# Patient Record
Sex: MALE | Race: WHITE | NOT HISPANIC OR LATINO | URBAN - METROPOLITAN AREA
[De-identification: names, ages, dates, MRNs, and addresses within clinical notes are randomized per-mention and may not be internally consistent; named-entity substitution may affect disease eponyms.]

---

## 2024-09-29 ENCOUNTER — INPATIENT (INPATIENT)
Facility: HOSPITAL | Age: 24
LOS: 4 days | Discharge: HOME | End: 2024-10-04
Attending: THORACIC SURGERY (CARDIOTHORACIC VASCULAR SURGERY) | Admitting: INTERNAL MEDICINE
Payer: COMMERCIAL

## 2024-09-29 VITALS
SYSTOLIC BLOOD PRESSURE: 139 MMHG | HEART RATE: 96 BPM | HEIGHT: 74 IN | WEIGHT: 199.96 LBS | TEMPERATURE: 99 F | OXYGEN SATURATION: 99 % | RESPIRATION RATE: 24 BRPM | DIASTOLIC BLOOD PRESSURE: 75 MMHG

## 2024-09-29 LAB
ANION GAP SERPL CALC-SCNC: 11 MMOL/L — SIGNIFICANT CHANGE UP (ref 5–17)
BASE EXCESS BLDV CALC-SCNC: 1.4 MMOL/L — SIGNIFICANT CHANGE UP (ref -2–3)
BASOPHILS # BLD AUTO: 0.02 K/UL — SIGNIFICANT CHANGE UP (ref 0–0.2)
BASOPHILS NFR BLD AUTO: 0.2 % — SIGNIFICANT CHANGE UP (ref 0–2)
BUN SERPL-MCNC: 15 MG/DL — SIGNIFICANT CHANGE UP (ref 7–23)
CA-I SERPL-SCNC: 1.19 MMOL/L — SIGNIFICANT CHANGE UP (ref 1.15–1.33)
CALCIUM SERPL-MCNC: 9.2 MG/DL — SIGNIFICANT CHANGE UP (ref 8.4–10.5)
CHLORIDE SERPL-SCNC: 106 MMOL/L — SIGNIFICANT CHANGE UP (ref 96–108)
CO2 BLDV-SCNC: 30 MMOL/L — HIGH (ref 22–26)
CO2 SERPL-SCNC: 24 MMOL/L — SIGNIFICANT CHANGE UP (ref 22–31)
CREAT SERPL-MCNC: 0.86 MG/DL — SIGNIFICANT CHANGE UP (ref 0.5–1.3)
EGFR: 125 ML/MIN/1.73M2 — SIGNIFICANT CHANGE UP
EOSINOPHIL # BLD AUTO: 0.22 K/UL — SIGNIFICANT CHANGE UP (ref 0–0.5)
EOSINOPHIL NFR BLD AUTO: 2.3 % — SIGNIFICANT CHANGE UP (ref 0–6)
FLUAV AG NPH QL: SIGNIFICANT CHANGE UP
FLUBV AG NPH QL: SIGNIFICANT CHANGE UP
GAS PNL BLDV: 136 MMOL/L — SIGNIFICANT CHANGE UP (ref 136–145)
GAS PNL BLDV: SIGNIFICANT CHANGE UP
GLUCOSE SERPL-MCNC: 101 MG/DL — HIGH (ref 70–99)
HCO3 BLDV-SCNC: 28 MMOL/L — SIGNIFICANT CHANGE UP (ref 22–29)
HCT VFR BLD CALC: 43.1 % — SIGNIFICANT CHANGE UP (ref 39–50)
HGB BLD-MCNC: 14.5 G/DL — SIGNIFICANT CHANGE UP (ref 13–17)
IMM GRANULOCYTES NFR BLD AUTO: 0.4 % — SIGNIFICANT CHANGE UP (ref 0–0.9)
LYMPHOCYTES # BLD AUTO: 1.36 K/UL — SIGNIFICANT CHANGE UP (ref 1–3.3)
LYMPHOCYTES # BLD AUTO: 14.3 % — SIGNIFICANT CHANGE UP (ref 13–44)
MCHC RBC-ENTMCNC: 29 PG — SIGNIFICANT CHANGE UP (ref 27–34)
MCHC RBC-ENTMCNC: 33.6 GM/DL — SIGNIFICANT CHANGE UP (ref 32–36)
MCV RBC AUTO: 86.2 FL — SIGNIFICANT CHANGE UP (ref 80–100)
MONOCYTES # BLD AUTO: 0.74 K/UL — SIGNIFICANT CHANGE UP (ref 0–0.9)
MONOCYTES NFR BLD AUTO: 7.8 % — SIGNIFICANT CHANGE UP (ref 2–14)
NEUTROPHILS # BLD AUTO: 7.11 K/UL — SIGNIFICANT CHANGE UP (ref 1.8–7.4)
NEUTROPHILS NFR BLD AUTO: 75 % — SIGNIFICANT CHANGE UP (ref 43–77)
NRBC # BLD: 0 /100 WBCS — SIGNIFICANT CHANGE UP (ref 0–0)
PCO2 BLDV: 52 MMHG — SIGNIFICANT CHANGE UP (ref 42–55)
PH BLDV: 7.34 — SIGNIFICANT CHANGE UP (ref 7.32–7.43)
PLATELET # BLD AUTO: 225 K/UL — SIGNIFICANT CHANGE UP (ref 150–400)
PO2 BLDV: <33 MMHG — SIGNIFICANT CHANGE UP (ref 25–45)
POTASSIUM BLDV-SCNC: 4.6 MMOL/L — SIGNIFICANT CHANGE UP (ref 3.5–5.1)
POTASSIUM SERPL-MCNC: 4.5 MMOL/L — SIGNIFICANT CHANGE UP (ref 3.5–5.3)
POTASSIUM SERPL-SCNC: 4.5 MMOL/L — SIGNIFICANT CHANGE UP (ref 3.5–5.3)
RAPID RVP RESULT: SIGNIFICANT CHANGE UP
RBC # BLD: 5 M/UL — SIGNIFICANT CHANGE UP (ref 4.2–5.8)
RBC # FLD: 12.2 % — SIGNIFICANT CHANGE UP (ref 10.3–14.5)
RSV RNA NPH QL NAA+NON-PROBE: SIGNIFICANT CHANGE UP
SAO2 % BLDV: 37.9 % — LOW (ref 67–88)
SARS-COV-2 RNA SPEC QL NAA+PROBE: SIGNIFICANT CHANGE UP
SARS-COV-2 RNA SPEC QL NAA+PROBE: SIGNIFICANT CHANGE UP
SODIUM SERPL-SCNC: 141 MMOL/L — SIGNIFICANT CHANGE UP (ref 135–145)
WBC # BLD: 9.49 K/UL — SIGNIFICANT CHANGE UP (ref 3.8–10.5)
WBC # FLD AUTO: 9.49 K/UL — SIGNIFICANT CHANGE UP (ref 3.8–10.5)

## 2024-09-29 PROCEDURE — 99291 CRITICAL CARE FIRST HOUR: CPT

## 2024-09-29 PROCEDURE — 71045 X-RAY EXAM CHEST 1 VIEW: CPT | Mod: 26

## 2024-09-29 PROCEDURE — 71250 CT THORAX DX C-: CPT | Mod: 26

## 2024-09-29 PROCEDURE — 99223 1ST HOSP IP/OBS HIGH 75: CPT | Mod: GC,25

## 2024-09-29 PROCEDURE — 32551 INSERTION OF CHEST TUBE: CPT | Mod: GC,RT

## 2024-09-29 PROCEDURE — 93010 ELECTROCARDIOGRAM REPORT: CPT

## 2024-09-29 RX ORDER — KETOROLAC TROMETHAMINE 10 MG/1
15 TABLET, FILM COATED ORAL EVERY 6 HOURS
Refills: 0 | Status: DISCONTINUED | OUTPATIENT
Start: 2024-09-29 | End: 2024-10-01

## 2024-09-29 RX ORDER — KETOROLAC TROMETHAMINE 10 MG/1
30 TABLET, FILM COATED ORAL EVERY 6 HOURS
Refills: 0 | Status: DISCONTINUED | OUTPATIENT
Start: 2024-09-29 | End: 2024-09-30

## 2024-09-29 RX ORDER — IPRATROPIUM BROMIDE AND ALBUTEROL SULFATE .5; 3 MG/3ML; MG/3ML
3 SOLUTION RESPIRATORY (INHALATION) ONCE
Refills: 0 | Status: COMPLETED | OUTPATIENT
Start: 2024-09-29 | End: 2024-09-29

## 2024-09-29 RX ORDER — METHYLPREDNISOLONE ACETATE 80 MG/ML
125 INJECTION, SUSPENSION INTRA-ARTICULAR; INTRALESIONAL; INTRAMUSCULAR; SOFT TISSUE ONCE
Refills: 0 | Status: COMPLETED | OUTPATIENT
Start: 2024-09-29 | End: 2024-09-29

## 2024-09-29 RX ORDER — ACETAMINOPHEN 325 MG
650 TABLET ORAL EVERY 6 HOURS
Refills: 0 | Status: DISCONTINUED | OUTPATIENT
Start: 2024-09-29 | End: 2024-09-30

## 2024-09-29 RX ORDER — KETOROLAC TROMETHAMINE 10 MG/1
15 TABLET, FILM COATED ORAL ONCE
Refills: 0 | Status: DISCONTINUED | OUTPATIENT
Start: 2024-09-29 | End: 2024-09-29

## 2024-09-29 RX ORDER — ACETAMINOPHEN 325 MG
650 TABLET ORAL ONCE
Refills: 0 | Status: COMPLETED | OUTPATIENT
Start: 2024-09-29 | End: 2024-09-29

## 2024-09-29 RX ORDER — SODIUM CHLORIDE 0.9 % (FLUSH) 0.9 %
1000 SYRINGE (ML) INJECTION ONCE
Refills: 0 | Status: COMPLETED | OUTPATIENT
Start: 2024-09-29 | End: 2024-09-29

## 2024-09-29 RX ADMIN — METHYLPREDNISOLONE ACETATE 125 MILLIGRAM(S): 80 INJECTION, SUSPENSION INTRA-ARTICULAR; INTRALESIONAL; INTRAMUSCULAR; SOFT TISSUE at 15:36

## 2024-09-29 RX ADMIN — KETOROLAC TROMETHAMINE 15 MILLIGRAM(S): 10 TABLET, FILM COATED ORAL at 20:00

## 2024-09-29 RX ADMIN — KETOROLAC TROMETHAMINE 15 MILLIGRAM(S): 10 TABLET, FILM COATED ORAL at 19:33

## 2024-09-29 RX ADMIN — IPRATROPIUM BROMIDE AND ALBUTEROL SULFATE 3 MILLILITER(S): .5; 3 SOLUTION RESPIRATORY (INHALATION) at 15:53

## 2024-09-29 RX ADMIN — IPRATROPIUM BROMIDE AND ALBUTEROL SULFATE 3 MILLILITER(S): .5; 3 SOLUTION RESPIRATORY (INHALATION) at 15:36

## 2024-09-29 RX ADMIN — Medication 1000 MILLILITER(S): at 15:39

## 2024-09-29 RX ADMIN — Medication 650 MILLIGRAM(S): at 15:36

## 2024-09-29 RX ADMIN — KETOROLAC TROMETHAMINE 15 MILLIGRAM(S): 10 TABLET, FILM COATED ORAL at 15:36

## 2024-09-29 NOTE — ED ADULT NURSE NOTE - OBJECTIVE STATEMENT
24 year old M patient with c/o chest pain x3days and SOB with cough x2days.  Speaking full clear sentences, A+Ox3, ambulatory w steady gait.  Placed on CM.  Went to  and told to come to ED.  IV #20 G to RAC placed, blood work sent.  Pt medicated as ordered.  Pt states he was sick 2 weeks ago, fever, which has subsided.

## 2024-09-29 NOTE — ED PROVIDER NOTE - PROGRESS NOTE DETAILS
[place]: CXR w/ large PTX. Remains stable. Will page pulm (pt stable) and start NRB. Updated pt. [place]: Labs grossly wnl. Pulm placed chest tube, pt feeling much better. Rediscussed w/ pulm, will prder CT and admit for further care. Updated pt.

## 2024-09-29 NOTE — CONSULT NOTE ADULT - ATTENDING COMMENTS
Primary spontaneous pneumothorax, s/p chest tube placement. Lung re expanded immediately. No parenchymal pulmonary disease on CT chest. Clamp trial in AM and removal. Pulmonary will follow. Primary spontaneous pneumothorax, s/p chest tube placement. Lung re expanded immediately. CT chest pending. Clamp trial in AM and removal. Pulmonary will follow.

## 2024-09-29 NOTE — PATIENT PROFILE ADULT - FUNCTIONAL ASSESSMENT - DAILY ACTIVITY 2.
Addended by: LOPEZ COHEN on: 4/22/2024 03:47 PM     Modules accepted: Level of Service     4 = No assist / stand by assistance

## 2024-09-29 NOTE — H&P ADULT - HISTORY OF PRESENT ILLNESS
This is a 24 y.o. male PMH of childhood asthma who presents to [facility] ED complaining of a sudden sharp pain in right lateral chest as well as difficulty breathing following a severe bout of coughing earlier today. Patient was in usual state of health until 12 days ago, when he started to develop a productive cough w/ yellow sputum. Patient also noted mild fevers to 100.3 at home, without any other associated symptoms. Patient continued to cough for 12 days, did not test for flu or covid. Earlier today, patient was coughing and experienced severe R lower chest pain which has since migrated to sternum.     ED Course-  VS:  98.7 F oral  |  139/75 mm Hg  |  69 HR /min  |  RR 24/min  |   90% room air  Labs:  CBC wnl   |  CMP wnl  | ABG pH 7.34 PCO2 30 | RVP negative  Imaging: CXR showing large R PTX   EKG: NSR, Qtc 418  Consults: Pulm  Interventions: R chest tube placement This is a 24 y.o. male PMH of childhood asthma who presents to [facility] ED complaining of a sudden sharp pain in right lateral chest as well as difficulty breathing following a severe bout of coughing earlier today. Patient was in usual state of health until 12 days ago, when he started to develop a productive cough w/ yellow sputum. Patient also noted mild fevers to 100.3 at home, without any other associated symptoms. Patient continued to cough for 12 days, did not test for flu or covid. Earlier today, patient was coughing and experienced severe R lower chest pain which has since migrated to sternum.     ED Course-  VS:  98.7 F oral  |  139/75 mm Hg  |  69 HR /min  |  RR 24/min  |   90% room air  Labs:  CBC wnl   |  CMP wnl  | ABG pH 7.34 PCO2 30 | COVID -, Influenza -, RSV -   Imaging: CXR showing large R PTX   EKG: NSR, Qtc 418  Consults: Pulm  Interventions: R chest tube placement
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
## 2024-09-29 NOTE — H&P ADULT - NSHPPHYSICALEXAM_GEN_ALL_CORE
T(C): 37.1 (09-29-24 @ 15:18), Max: 37.1 (09-29-24 @ 15:18)  HR: 85 (09-29-24 @ 18:04) (85 - 100)  BP: 127/72 (09-29-24 @ 18:04) (127/72 - 200/180)  RR: 16 (09-29-24 @ 18:04) (16 - 26)  SpO2: 100% (09-29-24 @ 18:04) (99% - 100%)    General: NAD, awake and alert, cooperative to exam, cooperating to exam  HEENT: No conjunctival injection, EOMI, MMM  Neck: supple, no JVD  Cardio: Euvolemic on exam, Warm and well perfused in bilateral upper and lower extremities, heart is with RRR, no murmurs auscultated  Resp: in respiratory distress on NRB mask, no cough, no crackles or wheezes auscultated.  Abdo: soft, NT/ND, +bowel sounds x4, no organomegaly or palpable mass    Vasc: 2+ radial and DP pulses b/l  Neuro: A&Ox3, no focal deficits  MSK: no joint swelling, full ROM

## 2024-09-29 NOTE — ED PROVIDER NOTE - OBJECTIVE STATEMENT
24M PMH childhood asthma p/w CP/cough/SOB. Has 12d of cough, yellow sputum. today while coughing he felt sudden onset of pain to R lateral chest and mid sternal region. Pain is worse w/ cough/movement/deep breaths. Had fever 2w ago, none since then. No other systemic symptoms. 24M PMH childhood asthma p/w CP/cough/SOB. Has 12d of cough, yellow sputum. today while coughing he felt sudden onset of pain to R lateral chest and mid sternal region. Pain is worse w/ cough/movement/deep breaths. +SOB. Felt lightheaded, now improved. Had fever 2w ago, none since then. No other systemic symptoms.  Denies rhinorrhea, sore throat, HA, NVD, abd pain, urinary complaints, LE pain/swelling, recent travel/immobilization.

## 2024-09-29 NOTE — ED ADULT TRIAGE NOTE - CHIEF COMPLAINT QUOTE
"I have been sick for 12 days but I can't breath now and I have a lot of chest pain". Patient O2 sat was 90% on room air and patient was breathing at 30 when he walked in

## 2024-09-29 NOTE — H&P ADULT - ASSESSMENT
This is a 24 y.o. male PMH of childhood asthma who complains of 1 day of sudden sharp pain in right lateral chest as well as difficulty breathing in the setting of 12 days of productive cough and mild fevers found to have large R PTX on chest X-ray admitted to [facility] for monitoring s/p chest tube placement in the ED.     NEURO   BERNARDINO     CVS   BERNARDINO     PULM   #R Pneumothorax   CXR significant for large R PTX   - s/p chest tube placement in ED   - f/u repeat CXR   - keep chest tube to suction o/n at -20     GI/   BERNARDINO    RENAL   BERNARDINO    ENDO   BERNARDINO    MSK   BERNARDINO    PPX   F: none  E: replete as needed  N: regular diet   VTE ppx: Lovenox 40 SubQ  GI ppx: none   Dispo: [facility]  Code status: FULL CODE   This is a 24 y.o. male PMH of childhood asthma who complains of 1 day of sudden sharp pain in right lateral chest as well as difficulty breathing in the setting of 12 days of productive cough and mild fevers found to have large R PTX on chest X-ray admitted to [facility] for monitoring s/p chest tube placement in the ED.     NEURO   BERNARDINO     CVS   BERNARDINO     PULM   #R Pneumothorax   CXR significant for large R PTX   - s/p chest tube placement in ED   - f/u repeat CXR   - keep chest tube to suction o/n at -20   - repeat CXR early AM   - Pain Regimen:     - tylenol for mild pain      - Toradol 15 for moderate pain      - toradol 30 for severe pain     GI/   BERNARDINO    RENAL   BERNARDINO    ENDO   BERNARDINO    MSK   BERNARDINO    PPX   F: none  E: replete as needed  N: regular diet   VTE ppx: Lovenox 40 SubQ  GI ppx: none   Dispo: [facility]  Code status: FULL CODE

## 2024-09-29 NOTE — CONSULT NOTE ADULT - ASSESSMENT
23 y/o M with cough for 12 days, presented with severe R chest pain and progressive dyspnea after a coughing bout earlier today. Childhood asthma and recreational vaping, otherwise no significant medical history. Pulmonary consulted for large R pneumothorax. At bedside patient noted to be heavily labored work of breathing. R chest tube placed in 2nd rib space, tolerated well. CT chest showed no underlying lung disease and mild subq emphysema.     #Primary spontaneous pneumothorax  In a tall young male following a coughing bout, which is the typical phenotype.     - keep chest tube to suction  - repeat CXR in am  - if pneumothorax resolves in am, can change to waterseal, followed by clamping trial and removal if no progression/recurrence at timed intervals  - will aim to remove by tomorrow  - pulmonary will follow  - can d/c NRB oxygen     Patient was seen, evaluated and discussed with PCCM attending.    Shahab Rapp MD  PCCM Fellow, PGY-5    Please call ext [phone] or page pulmonary if there are any questions with above recommendations.     23 y/o M with cough for 12 days, presented with severe R chest pain and progressive dyspnea after a coughing bout earlier today. Childhood asthma and recreational vaping, otherwise no significant medical history. Pulmonary consulted for large R pneumothorax. At bedside patient noted to be heavily labored work of breathing. R chest tube placed in 2nd rib space, tolerated well.     #Primary spontaneous pneumothorax  In a tall young male following a coughing bout, which is the typical phenotype.     - keep chest tube to suction  - repeat CXR in am  - if pneumothorax resolves in am, can change to waterseal, followed by clamping trial and removal if no progression/recurrence at timed intervals  - will aim to remove by tomorrow  - pulmonary will follow  - can d/c NRB oxygen     Patient was seen, evaluated and discussed with PCCM attending.    Shahab Rapp MD  PCCM Fellow, PGY-5    Please call ext [phone] or page pulmonary if there are any questions with above recommendations.

## 2024-09-29 NOTE — PATIENT PROFILE ADULT - FALL HARM RISK - HARM RISK INTERVENTIONS

## 2024-09-29 NOTE — H&P ADULT - NSHPLABSRESULTS_GEN_ALL_CORE
LABS:                         14.5   9.49  )-----------( 225      ( 29 Sep 2024 15:29 )             43.1     09-29    141  |  106  |  15  ----------------------------<  101[H]  4.5   |  24  |  0.86    Ca    9.2      29 Sep 2024 15:29        Urinalysis Basic - ( 29 Sep 2024 15:29 )    Color: x / Appearance: x / SG: x / pH: x  Gluc: 101 mg/dL / Ketone: x  / Bili: x / Urobili: x   Blood: x / Protein: x / Nitrite: x   Leuk Esterase: x / RBC: x / WBC x   Sq Epi: x / Non Sq Epi: x / Bacteria: x            RADIOLOGY, EKG & ADDITIONAL TESTS: Reviewed.

## 2024-09-29 NOTE — ED PROVIDER NOTE - CLINICAL SUMMARY MEDICAL DECISION MAKING FREE TEXT BOX
24M PMH childhood asthma p/w CP/cough/SOB. Has 12d of cough, yellow sputum. today while coughing he felt sudden onset of pain to R lateral chest and mid sternal region. Pain is worse w/ cough/movement/deep breaths. +SOB. Felt lightheaded, now improved. Had fever 2w ago, none since then. No other systemic symptoms.  Triage note mentions initial RR 30 and satting 90% on RA.   I evaluated pt immediately on arrival to room - pt is minimally tachypneic, satting 93-94% on RA, no resp distress, other vitals wnl. Exam as above.   ddx: Likely URI. Possible MSK vs. lower suspicion of PTX. Likely component of RAD. Clinically not ACS, PE, tamponade, dissection, perf, mediastinitis, myocarditis.  Labs, CXR, IVF/meds/steroids, reassess.

## 2024-09-29 NOTE — ED PROVIDER NOTE - PHYSICAL EXAMINATION
resp: mild diffuse expiratory wheeze  no LE edema, normal equal distal pulses, steady unassisted gait.   No crepitus, firmness, induration, fluctuance. Skin is normal temp. No erythema/warmth. No obvious skin breaks.

## 2024-09-29 NOTE — H&P ADULT - ATTENDING COMMENTS
Very large right sided, primary spontaneous pneumothorax due to forceful cough. S/p chest tube placement in ED by pulm. CT chest done subsequently w/o significant underlying parenchymal lung disease. Clamp trial and removal in AM. Rest as per above.

## 2024-09-30 ENCOUNTER — TRANSCRIPTION ENCOUNTER (OUTPATIENT)
Age: 24
End: 2024-09-30

## 2024-09-30 LAB
ANION GAP SERPL CALC-SCNC: 10 MMOL/L — SIGNIFICANT CHANGE UP (ref 5–17)
BASOPHILS # BLD AUTO: 0.01 K/UL — SIGNIFICANT CHANGE UP (ref 0–0.2)
BASOPHILS NFR BLD AUTO: 0.1 % — SIGNIFICANT CHANGE UP (ref 0–2)
BUN SERPL-MCNC: 16 MG/DL — SIGNIFICANT CHANGE UP (ref 7–23)
CALCIUM SERPL-MCNC: 9.2 MG/DL — SIGNIFICANT CHANGE UP (ref 8.4–10.5)
CHLORIDE SERPL-SCNC: 105 MMOL/L — SIGNIFICANT CHANGE UP (ref 96–108)
CO2 SERPL-SCNC: 23 MMOL/L — SIGNIFICANT CHANGE UP (ref 22–31)
CREAT SERPL-MCNC: 0.72 MG/DL — SIGNIFICANT CHANGE UP (ref 0.5–1.3)
EGFR: 132 ML/MIN/1.73M2 — SIGNIFICANT CHANGE UP
EOSINOPHIL # BLD AUTO: 0 K/UL — SIGNIFICANT CHANGE UP (ref 0–0.5)
EOSINOPHIL NFR BLD AUTO: 0 % — SIGNIFICANT CHANGE UP (ref 0–6)
GLUCOSE SERPL-MCNC: 139 MG/DL — HIGH (ref 70–99)
HCT VFR BLD CALC: 37.9 % — LOW (ref 39–50)
HGB BLD-MCNC: 12.8 G/DL — LOW (ref 13–17)
IMM GRANULOCYTES NFR BLD AUTO: 0.6 % — SIGNIFICANT CHANGE UP (ref 0–0.9)
INR BLD: 0.91 — SIGNIFICANT CHANGE UP (ref 0.85–1.16)
LYMPHOCYTES # BLD AUTO: 0.7 K/UL — LOW (ref 1–3.3)
LYMPHOCYTES # BLD AUTO: 7.5 % — LOW (ref 13–44)
MAGNESIUM SERPL-MCNC: 2 MG/DL — SIGNIFICANT CHANGE UP (ref 1.6–2.6)
MCHC RBC-ENTMCNC: 30.3 PG — SIGNIFICANT CHANGE UP (ref 27–34)
MCHC RBC-ENTMCNC: 33.8 GM/DL — SIGNIFICANT CHANGE UP (ref 32–36)
MCV RBC AUTO: 89.6 FL — SIGNIFICANT CHANGE UP (ref 80–100)
MONOCYTES # BLD AUTO: 0.6 K/UL — SIGNIFICANT CHANGE UP (ref 0–0.9)
MONOCYTES NFR BLD AUTO: 6.4 % — SIGNIFICANT CHANGE UP (ref 2–14)
NEUTROPHILS # BLD AUTO: 7.96 K/UL — HIGH (ref 1.8–7.4)
NEUTROPHILS NFR BLD AUTO: 85.4 % — HIGH (ref 43–77)
NRBC # BLD: 0 /100 WBCS — SIGNIFICANT CHANGE UP (ref 0–0)
PHOSPHATE SERPL-MCNC: 4.1 MG/DL — SIGNIFICANT CHANGE UP (ref 2.5–4.5)
PLATELET # BLD AUTO: 181 K/UL — SIGNIFICANT CHANGE UP (ref 150–400)
POTASSIUM SERPL-MCNC: 4.5 MMOL/L — SIGNIFICANT CHANGE UP (ref 3.5–5.3)
POTASSIUM SERPL-SCNC: 4.5 MMOL/L — SIGNIFICANT CHANGE UP (ref 3.5–5.3)
PROTHROM AB SERPL-ACNC: 10.5 SEC — SIGNIFICANT CHANGE UP (ref 9.9–13.4)
RBC # BLD: 4.23 M/UL — SIGNIFICANT CHANGE UP (ref 4.2–5.8)
RBC # FLD: 12.2 % — SIGNIFICANT CHANGE UP (ref 10.3–14.5)
SODIUM SERPL-SCNC: 138 MMOL/L — SIGNIFICANT CHANGE UP (ref 135–145)
WBC # BLD: 9.33 K/UL — SIGNIFICANT CHANGE UP (ref 3.8–10.5)
WBC # FLD AUTO: 9.33 K/UL — SIGNIFICANT CHANGE UP (ref 3.8–10.5)

## 2024-09-30 PROCEDURE — 71045 X-RAY EXAM CHEST 1 VIEW: CPT | Mod: 26,77

## 2024-09-30 PROCEDURE — 99233 SBSQ HOSP IP/OBS HIGH 50: CPT

## 2024-09-30 PROCEDURE — 99233 SBSQ HOSP IP/OBS HIGH 50: CPT | Mod: GC

## 2024-09-30 PROCEDURE — 71045 X-RAY EXAM CHEST 1 VIEW: CPT | Mod: 26,76

## 2024-09-30 RX ORDER — ACETAMINOPHEN 325 MG
1000 TABLET ORAL ONCE
Refills: 0 | Status: DISCONTINUED | OUTPATIENT
Start: 2024-09-30 | End: 2024-09-30

## 2024-09-30 RX ORDER — HYDROMORPHONE HYDROCHLORIDE 1 MG/ML
0.25 INJECTION, SOLUTION INTRAMUSCULAR; INTRAVENOUS; SUBCUTANEOUS ONCE
Refills: 0 | Status: DISCONTINUED | OUTPATIENT
Start: 2024-09-30 | End: 2024-09-30

## 2024-09-30 RX ORDER — ACETAMINOPHEN 325 MG
650 TABLET ORAL EVERY 6 HOURS
Refills: 0 | Status: DISCONTINUED | OUTPATIENT
Start: 2024-09-30 | End: 2024-10-02

## 2024-09-30 RX ORDER — GUAIFENESIN 100 MG/5ML
200 SOLUTION ORAL EVERY 6 HOURS
Refills: 0 | Status: DISCONTINUED | OUTPATIENT
Start: 2024-09-30 | End: 2024-10-02

## 2024-09-30 RX ORDER — HYDROMORPHONE HYDROCHLORIDE 1 MG/ML
0.25 INJECTION, SOLUTION INTRAMUSCULAR; INTRAVENOUS; SUBCUTANEOUS EVERY 6 HOURS
Refills: 0 | Status: DISCONTINUED | OUTPATIENT
Start: 2024-09-30 | End: 2024-10-02

## 2024-09-30 RX ORDER — GUAIFENESIN 100 MG/5ML
200 SOLUTION ORAL EVERY 6 HOURS
Refills: 0 | Status: DISCONTINUED | OUTPATIENT
Start: 2024-09-30 | End: 2024-09-30

## 2024-09-30 RX ORDER — BENZONATATE 150 MG/1
100 CAPSULE ORAL EVERY 8 HOURS
Refills: 0 | Status: DISCONTINUED | OUTPATIENT
Start: 2024-09-30 | End: 2024-10-02

## 2024-09-30 RX ORDER — BENZONATATE 150 MG/1
100 CAPSULE ORAL EVERY 8 HOURS
Refills: 0 | Status: DISCONTINUED | OUTPATIENT
Start: 2024-09-30 | End: 2024-09-30

## 2024-09-30 RX ADMIN — KETOROLAC TROMETHAMINE 15 MILLIGRAM(S): 10 TABLET, FILM COATED ORAL at 11:11

## 2024-09-30 RX ADMIN — BENZONATATE 100 MILLIGRAM(S): 150 CAPSULE ORAL at 15:33

## 2024-09-30 RX ADMIN — BENZONATATE 100 MILLIGRAM(S): 150 CAPSULE ORAL at 22:12

## 2024-09-30 RX ADMIN — BENZONATATE 100 MILLIGRAM(S): 150 CAPSULE ORAL at 07:55

## 2024-09-30 RX ADMIN — KETOROLAC TROMETHAMINE 15 MILLIGRAM(S): 10 TABLET, FILM COATED ORAL at 01:26

## 2024-09-30 RX ADMIN — GUAIFENESIN 200 MILLIGRAM(S): 100 SOLUTION ORAL at 12:02

## 2024-09-30 RX ADMIN — HYDROMORPHONE HYDROCHLORIDE 0.25 MILLIGRAM(S): 1 INJECTION, SOLUTION INTRAMUSCULAR; INTRAVENOUS; SUBCUTANEOUS at 16:26

## 2024-09-30 RX ADMIN — KETOROLAC TROMETHAMINE 15 MILLIGRAM(S): 10 TABLET, FILM COATED ORAL at 02:26

## 2024-09-30 RX ADMIN — HYDROMORPHONE HYDROCHLORIDE 0.25 MILLIGRAM(S): 1 INJECTION, SOLUTION INTRAMUSCULAR; INTRAVENOUS; SUBCUTANEOUS at 15:56

## 2024-09-30 RX ADMIN — KETOROLAC TROMETHAMINE 15 MILLIGRAM(S): 10 TABLET, FILM COATED ORAL at 10:41

## 2024-09-30 RX ADMIN — GUAIFENESIN 200 MILLIGRAM(S): 100 SOLUTION ORAL at 18:20

## 2024-09-30 RX ADMIN — Medication 650 MILLIGRAM(S): at 18:20

## 2024-09-30 NOTE — DISCHARGE NOTE PROVIDER - CARE PROVIDER_API CALL
Mendoza Gibson  Internal Medicine  [address] [place], [place] [address]  Phone: [phone]  Fax: [phone]  Follow Up Time:    Mavis Will  Pulmonary Disease  [address] [place] [address]  Phone: [phone]  Fax: [phone]  Scheduled Appointment: 10/11/2024 11:30 AM   Jese Dowd  Thoracic Surgery  [address], [address]  [place] [address]  Phone: [phone]  Fax: [phone]  Scheduled Appointment: 10/17/2024 09:00 AM    John White  Pulmonary Disease  [address] [place] [address]  Phone: [phone]  Fax: [phone]  Follow Up Time: 1 month

## 2024-09-30 NOTE — DISCHARGE NOTE PROVIDER - CARE PROVIDERS DIRECT ADDRESSES
,[email or web address] ,DirectAddress_Unknown ,[email or web address],DirectAddress_Unknown

## 2024-09-30 NOTE — PROGRESS NOTE ADULT - SUBJECTIVE AND OBJECTIVE BOX
**INCOMPLETE NOTE    INTERVAL/OVERNIGHT EVENTS: None    SUBJECTIVE:  Patient seen and examined at bedside, comfortable, NAD. Denied fever, chest pain, dyspnea, abdominal pain.     Vital Signs Last 12 Hrs  T(F): 98.7 (09-30-24 @ 05:30), Max: 99.3 (09-29-24 @ 21:02)  HR: 70 (09-30-24 @ 04:29) (70 - 90)  BP: 142/87 (09-30-24 @ 04:29) (124/63 - 142/87)  BP(mean): 108 (09-30-24 @ 04:29) (86 - 108)  RR: 20 (09-30-24 @ 00:25) (16 - 20)  SpO2: 99% (09-30-24 @ 04:29) (97% - 100%)  I&O's Summary      PHYSICAL EXAM:  General: NAD  HEENT: PERRL, EOM intact, sclera anicteric, MMM  Cardiovascular: RRR; no MRG; no JVD  Respiratory: CTAB; no WRR  GI/: soft; NTND; BS x4  Extremities: WWP; 2+ peripheral pulses bilaterally; no LE edema  Skin: normal color & turgor; no rash  Neurologic: aox3; no focal deficits    LABS:                        14.5   9.49  )-----------( 225      ( 29 Sep 2024 15:29 )             43.1     09-29    141  |  106  |  15  ----------------------------<  101[H]  4.5   |  24  |  0.86    Ca    9.2      29 Sep 2024 15:29        Urinalysis Basic - ( 29 Sep 2024 15:29 )    Color: x / Appearance: x / SG: x / pH: x  Gluc: 101 mg/dL / Ketone: x  / Bili: x / Urobili: x   Blood: x / Protein: x / Nitrite: x   Leuk Esterase: x / RBC: x / WBC x   Sq Epi: x / Non Sq Epi: x / Bacteria: x          RADIOLOGY & ADDITIONAL TESTS:    MEDICATIONS  (STANDING):    MEDICATIONS  (PRN):  acetaminophen     Tablet .. 650 milliGRAM(s) Oral every 6 hours PRN Mild Pain (1 - 3)  benzonatate 100 milliGRAM(s) Oral every 8 hours PRN Cough  ketorolac   Injectable 15 milliGRAM(s) IV Push every 6 hours PRN Moderate Pain (4 - 6)  ketorolac   Injectable 30 milliGRAM(s) IV Push every 6 hours PRN Severe Pain (7 - 10)   INTERVAL/OVERNIGHT EVENTS: Chest tube on suction -20 o/n, placed on clamp around 8 am     SUBJECTIVE:  Patient seen and examined at bedside, comfortable, complaining of continuing cough and mild pain at chest tube insertion site. Denied fever, chest pain, dyspnea, abdominal pain.     Vital Signs Last 12 Hrs  T(F): 98.7 (09-30-24 @ 05:30), Max: 99.3 (09-29-24 @ 21:02)  HR: 70 (09-30-24 @ 04:29) (70 - 90)  BP: 142/87 (09-30-24 @ 04:29) (124/63 - 142/87)  BP(mean): 108 (09-30-24 @ 04:29) (86 - 108)  RR: 20 (09-30-24 @ 00:25) (16 - 20)  SpO2: 99% (09-30-24 @ 04:29) (97% - 100%)  I&O's Summary      PHYSICAL EXAM:  General: NAD, chest tube R pectoral   HEENT: PERRL, EOM intact, sclera anicteric, MMM  Cardiovascular: RRR; no MRG; no JVD  Respiratory: CTAB; no WRR  GI/: soft; NTND; BS x4  Extremities: WWP; 2+ peripheral pulses bilaterally; no LE edema  Skin: normal color & turgor; no rash  Neurologic: aox3; no focal deficits    LABS:                        14.5   9.49  )-----------( 225      ( 29 Sep 2024 15:29 )             43.1     09-29    141  |  106  |  15  ----------------------------<  101[H]  4.5   |  24  |  0.86    Ca    9.2      29 Sep 2024 15:29        Urinalysis Basic - ( 29 Sep 2024 15:29 )    Color: x / Appearance: x / SG: x / pH: x  Gluc: 101 mg/dL / Ketone: x  / Bili: x / Urobili: x   Blood: x / Protein: x / Nitrite: x   Leuk Esterase: x / RBC: x / WBC x   Sq Epi: x / Non Sq Epi: x / Bacteria: x          RADIOLOGY & ADDITIONAL TESTS:    MEDICATIONS  (STANDING):    MEDICATIONS  (PRN):  acetaminophen     Tablet .. 650 milliGRAM(s) Oral every 6 hours PRN Mild Pain (1 - 3)  benzonatate 100 milliGRAM(s) Oral every 8 hours PRN Cough  ketorolac   Injectable 15 milliGRAM(s) IV Push every 6 hours PRN Moderate Pain (4 - 6)  ketorolac   Injectable 30 milliGRAM(s) IV Push every 6 hours PRN Severe Pain (7 - 10)

## 2024-09-30 NOTE — DISCHARGE NOTE PROVIDER - NSDCFUSCHEDAPPT_GEN_ALL_CORE_FT
Mavis Will  [facility]  PULMMED [address]  Scheduled Appointment: 10/11/2024    Jese Dowd  [facility]  THORSURG [address]  Scheduled Appointment: 10/17/2024

## 2024-09-30 NOTE — DISCHARGE NOTE PROVIDER - NSDCCPTREATMENT_GEN_ALL_CORE_FT
PRINCIPAL PROCEDURE  Procedure: Blebectomy, thoracoscopic, with pleurodesis  Findings and Treatment: R VATS RA RUL blebectomy, pleurectomy and mechanical pleurodesis

## 2024-09-30 NOTE — DISCHARGE NOTE PROVIDER - NSDCFUADDINST_GEN_ALL_CORE_FT
-Walk daily as tolerated and use your incentive spirometer 10 times every hour while you are awake.     -No driving or strenuous activity/exercise until cleared by your surgeon.    -Gently clean your incisions with unscented/antibacterial soap and water, pat dry.  You may leave them open to air.    -Call your doctor if you have shortness of breath, chest pain not relieved by pain medication, dizziness, fever >101.5, or increased redness or drainage from incisions.

## 2024-09-30 NOTE — PROGRESS NOTE ADULT - ASSESSMENT
This is a 24 y.o. male PMH of childhood asthma who complains of 1 day of sudden sharp pain in right lateral chest as well as difficulty breathing in the setting of 12 days of productive cough and mild fevers found to have large R PTX on chest X-ray admitted to [facility] for monitoring s/p chest tube placement in the ED.     NEURO   BERNARDINO     CVS   BERNARDINO     PULM   #R Pneumothorax   CXR significant for large R PTX   - s/p chest tube placement in ED   - f/u repeat CXR   - keep chest tube to suction o/n at -20   - repeat CXR early AM   - Pain Regimen:     - tylenol for mild pain      - Toradol 15 for moderate pain      - toradol 30 for severe pain     GI/   BERNARDINO    RENAL   BERNARDINO    ENDO   BERNARDINO    MSK   BERNARDINO    PPX   F: none  E: replete as needed  N: regular diet   VTE ppx: Lovenox 40 SubQ  GI ppx: none   Dispo: [facility]  Code status: FULL CODE   This is a 24 y.o. male PMH of childhood asthma who complains of 1 day of sudden sharp pain in right lateral chest as well as difficulty breathing in the setting of 12 days of productive cough and mild fevers found to have large R PTX on chest X-ray admitted to [facility] for monitoring s/p chest tube placement in the ED.     NEURO   BERNARDINO     CVS   BERNARDINO     PULM   #R Pneumothorax   CXR significant for large R PTX   - s/p chest tube placement in ED   - f/u repeat CXR   - keep chest tube to suction o/n at -20   - repeat CXR in am  - if pneumothorax resolves in am, can change to waterseal, followed by clamping trial and removal if no progression/recurrence at timed intervals  - Pain Regimen:     - tylenol for mild pain      - Toradol 15 for moderate pain      - toradol 30 for severe pain     GI/   BERNARDINO    RENAL   BERNARDINO    ENDO   BERNARDINO    MSK   BERNARDINO    PPX   F: none  E: replete as needed  N: regular diet   VTE ppx: Lovenox 40 SubQ  GI ppx: none   Dispo: [facility]  Code status: FULL CODE   This is a 24 y.o. male PMH of childhood asthma who complains of 1 day of sudden sharp pain in right lateral chest as well as difficulty breathing in the setting of 12 days of productive cough and mild fevers found to have large R PTX on chest X-ray admitted to [facility] for monitoring s/p chest tube placement in the ED.     NEURO   BERNARDINO     CVS   BERNARDINO     PULM   #R Pneumothorax   CXR in ED significant for large R PTX   CT: Small right apical pneumothorax which is significantly improved from earlier same day chest radiograph status post right chest tube placement. A communicating apical right upper lobe bleb is likely causal.  Small lingular groundglass opacities nonspecific and may be infectious versus inflammatoryRepeat CXR s/p chest tube: small apical R PTX   Repeat CXR AM: interval improvement of small apical R PTX, new trace L pleural effusion  - s/p chest tube placement in ED, lung re-expanded immediately   - keep chest tube to suction o/n at -20   - if pneumothorax resolves in am, can change to waterseal, followed by clamping trial and removal if no progression/recurrence at timed intervals  - Pain Regimen:     - tylenol for mild pain      - Toradol 15 for moderate pain      - toradol 30 for severe pain     GI/   BERNARDINO    RENAL   BERNARDINO    ENDO   BERNARDINO    MSK   BERNARDINO    PPX   F: none  E: replete as needed  N: regular diet   VTE ppx: Lovenox 40 SubQ  GI ppx: none   Dispo: [facility]  Code status: FULL CODE   This is a 24 y.o. male PMH of childhood asthma who complains of 1 day of sudden sharp pain in right lateral chest as well as difficulty breathing in the setting of 12 days of productive cough and mild fevers found to have large R PTX on chest X-ray admitted to [facility] for monitoring s/p chest tube placement in the ED.     NEURO   BERNARDINO     CVS   BERNARDINO     PULM   #R Pneumothorax   CXR in ED significant for large R PTX   CT: Small right apical pneumothorax which is significantly improved from earlier same day chest radiograph status post right chest tube placement. A communicating apical right upper lobe bleb is likely causal.  Small lingular groundglass opacities nonspecific and may be infectious versus inflammatoryRepeat CXR s/p chest tube: small apical R PTX   Repeat CXR AM: interval improvement of small apical R PTX, new trace L pleural effusion  - s/p chest tube placement in ED, lung re-expanded immediately   - keep chest tube to suction o/n at -20   - s/p change to water seal followed by clamping trial and removal if no progression/recurrence at timed intervals  - Pain Regimen:     - tylenol for mild pain      - Toradol 15 for moderate pain      - toradol 30 for severe pain     GI/   BERNARDINO    RENAL   BERNARDINO    ENDO   BERNARDINO    MSK   BERNARDINO    PPX   F: none  E: replete as needed  N: regular diet   VTE ppx: Lovenox 40 SubQ  GI ppx: none   Dispo: [facility]  Code status: FULL CODE   This is a 24 y.o. male PMH of childhood asthma who complains of 1 day of sudden sharp pain in right lateral chest as well as difficulty breathing in the setting of 12 days of productive cough and mild fevers found to have large R PTX on chest X-ray admitted to [facility] for monitoring s/p chest tube placement in the ED.     NEURO   BERNARDINO     CVS   BERNARDINO     PULM   #R Pneumothorax   CXR in ED significant for large R PTX   CT: Small right apical pneumothorax which is significantly improved from earlier same day chest radiograph status post right chest tube placement. A communicating apical right upper lobe bleb is likely causal.  Small lingular groundglass opacities nonspecific and may be infectious versus inflammatoryRepeat CXR s/p chest tube: small apical R PTX   Repeat CXR AM: interval improvement of small apical R PTX, new trace L pleural effusion  - s/p chest tube placement in ED, lung re-expanded immediately   - keep chest tube to suction o/n at -20   - s/p change to water seal followed by clamping trial and removal if no progression/recurrence at timed intervals  - Pain Regimen:     - tylenol for mild pain      - Toradol 15 for moderate pain      - toradol 30 for severe pain   - standing tessalon perle and robitussen for cough     GI/   BERNARDINO    RENAL   BERNARDINO    ENDO   BERNARDINO    MSK   BERNARDINO    PPX   F: none  E: replete as needed  N: regular diet   VTE ppx: Lovenox 40 SubQ  GI ppx: none   Dispo: [facility]  Code status: FULL CODE

## 2024-09-30 NOTE — PROGRESS NOTE ADULT - ASSESSMENT
23 y/o M with cough for 12 days, presented with severe R chest pain and progressive dyspnea after a coughing bout earlier today. Childhood asthma and recreational vaping, otherwise no significant medical history. Pulmonary consulted for large R pneumothorax. At bedside patient noted to be heavily labored work of breathing. R chest tube placed in 2nd rib space, tolerated well. CT chest showed a few blebs and mild subq emphysema.     #Primary spontaneous pneumothorax  In a tall young male following a coughing bout, likely 2/2 viral URI, which is the typical phenotype. PTX has resolved with chest tube to section and pt remained asymptomatic without radiographic evidence of PTX with chest tube on waterseal. Chest tube has been clamped, pending repeat CXR prior to removal of chest tube.    - leave tube clamped and repeat CXR at 3 pm, if no pneumothorax will remove chest tube  - no flying or scuba diving for 4-6 weeks  - follow up with Dr. Gibson in 2-4 weeks for repeat imaging and assessment of symptoms, discussion about CT surgery    Patient was seen, evaluated and discussed with Dr. Gibson    25 y/o M with cough for 12 days, presented with severe R chest pain and progressive dyspnea after a coughing bout earlier today. Childhood asthma and recreational vaping, otherwise no significant medical history. Pulmonary consulted for large R pneumothorax. At bedside patient noted to be heavily labored work of breathing. R chest tube placed in 2nd rib space, tolerated well. CT chest showed a few blebs and mild subq emphysema.     #Primary spontaneous pneumothorax  In a tall young male following a coughing bout, likely 2/2 viral URI, which is the typical phenotype. PTX has resolved with chest tube to section and pt remained asymptomatic without radiographic evidence of PTX with chest tube on waterseal. Chest tube was clamped, repeat CXR showed reaccumulation of air. Unclamped tube and placed back on suction. Repeat CXR showed re-expansion of lung, will place on water seal overnight.    - leave chest tube to water seal  - repeat CXR in AM  - if symptoms worsen overnight can repeat CXR and place on suction if re-accumulation of PTX  - no flying or scuba diving for 4-6 weeks  - follow up with Dr. Gibson in 2-4 weeks for repeat imaging and assessment of symptoms, discussion about CT surgery    Patient was seen, evaluated and discussed with Dr. Gibson

## 2024-09-30 NOTE — DISCHARGE NOTE PROVIDER - HOSPITAL COURSE
Patient discussed on morning rounds with Dr. Dowd  Operation Date: R VATS RA RUL blebectomy, pleurectomy and mechanical pleurodesis  Primary Surgeon/Attending MD: Dr. Dowd  Referring Physician: Michelle   _ _ _ _ _ _ _ _ _ _ _ _     HOSPITAL COURSE:     23 YO Male w/ PMHx of childhood asthma who originally presented to [facility] ED c/o sudden sharp pain in right lateral chest a/w difficulty breathing following a severe bout of coughing earlier in the day. Upon arrival, pt found to have a large R PTX. Pigtail placed emergently in ER by pulmonology and pt admitted to medicine service. He failed 3 waterseal trials and thoracic surgery was consulted for surgical management. On 10/2/24 patient underwent R VATS RA RUL blebectomy, pleurectomy and mechanical pleurodesis. Patient recovered in PACU with CT x1 on suction. POD1 CT remaind on suction. POD2 CT put on WS without air leaks and subsequently pulled. Cleared for discharge per Dr. Dowd. At time of discharge he is hemodynamically stable, voiding well, passing gas, ambulating in the hallway, and pain controlled under oral regimen.    _ _ _ _ _ _ _ _ _ _ _ _   DISCHARGE PHYSICAL EXAM:   General: Sitting in bed comfortably in NAD  Neuro: A&Ox3, no focal deficits   HEENT: NCAT, EOMI   Cardiac: Regular rate and rhythm, normal S1 and S2. No m/r/g   Pulm: Breathing comfortably on room air. No signs of respiratory distress. Lungs are CTA b/l without wheezes, rales, or rhonchi   Abdomen: Soft, non-distended, non-tender. + bowel sounds   Extremities: Warm and well perfused, no peripheral edema, distal pulses 2+. No calf tenderness. SCDs and TEDs in place  MSK: Full AROM   Wound: Chest tube site without drainage or erythema. Port sites with steri-strips.    _ _ _ _   _ _ _ _ _ _ _ _     REMOVAL CHECKLIST:         [ No] Epicardial wires         [ No] Stitches/tie downs,   If no, why?          [ No ] PICC/Midline,   If no, why?    _ _ _ _ _ _ _ _ _ _ _ _     MEDICATION DISCHARGE CHECKLIST     Pain control    _ _ _ _ _ _ _ _ _ _ _ _     CLINICAL FOLLOW UP NEEDS:        [ No] Lab work needed:      [No ] Imaging needed:      [No ] Home equipment            Type: (i.e. wound vac, pneumostat, prevena, wet/dry dressings, picc/midlines, MCOT, rain etc)   _ _ _ _ _ _ _ _ _ _ _ _   Over 35 minutes was spent with the patient reviewing the discharge material including medications, follow up appointments, recovery, concerning symptoms, and how to contact their health care providers if they have questions    Patient discussed on morning rounds with Dr. Dowd  Operation Date: R VATS RA RUL blebectomy, pleurectomy and mechanical pleurodesis  Primary Surgeon/Attending MD: Dr. Dowd  Referring Physician: Michelle   _ _ _ _ _ _ _ _ _ _ _ _     HOSPITAL COURSE:   23 YO Male w/ PMHx of childhood asthma who originally presented to [facility] ED c/o sudden sharp pain in right lateral chest a/w difficulty breathing following a severe bout of coughing earlier in the day. Upon arrival, pt found to have a large R PTX. Pigtail placed emergently in ER by pulmonology and pt admitted to medicine service. He failed 3 waterseal trials and thoracic surgery was consulted for surgical management. On 10/2/24 patient underwent R VATS RA RUL blebectomy, pleurectomy and mechanical pleurodesis. Patient recovered in PACU with CT x1 on suction. POD1 CT remained on suction. POD2 CT put on WS without air leaks and subsequently pulled. Cleared for discharge per Dr. Dowd. At time of discharge he is hemodynamically stable, voiding well, passing gas, ambulating in the hallway, and pain controlled under oral regimen.    _ _ _ _ _ _ _ _ _ _ _ _   DISCHARGE PHYSICAL EXAM:   General: Sitting in bed comfortably in NAD  Neuro: A&Ox3, no focal deficits   HEENT: NCAT, EOMI   Cardiac: Regular rate and rhythm, normal S1 and S2. No m/r/g   Pulm: Breathing comfortably on room air. No signs of respiratory distress. Lungs are CTA b/l without wheezes, rales, or rhonchi   Abdomen: Soft, non-distended, non-tender. + bowel sounds   Extremities: Warm and well perfused, no peripheral edema, distal pulses 2+. No calf tenderness. SCDs and TEDs in place  MSK: Full AROM   Wound: Chest tube site without drainage or erythema. Port sites with steri-strips.    _ _ _ _   _ _ _ _ _ _ _ _     REMOVAL CHECKLIST:         [ No] Epicardial wires         [ No] Stitches/tie downs,   If no, why?          [ No ] PICC/Midline,   If no, why?    _ _ _ _ _ _ _ _ _ _ _ _     MEDICATION DISCHARGE CHECKLIST     Pain control    _ _ _ _ _ _ _ _ _ _ _ _     CLINICAL FOLLOW UP NEEDS:        [ No] Lab work needed:      [No ] Imaging needed:      [No ] Home equipment            Type: (i.e. wound vac, pneumostat, prevena, wet/dry dressings, picc/midlines, MCOT, rain etc)   _ _ _ _ _ _ _ _ _ _ _ _   Over 35 minutes was spent with the patient reviewing the discharge material including medications, follow up appointments, recovery, concerning symptoms, and how to contact their health care providers if they have questions

## 2024-09-30 NOTE — PROGRESS NOTE ADULT - SUBJECTIVE AND OBJECTIVE BOX
PULMONARY CONSULT SERVICE FOLLOW-UP NOTE    INTERVAL HPI:  Reviewed chart and overnight events; patient seen and examined at bedside. CXR this morning showed resolution of pneumothorax and chest tube was placed from suction to waterseal. Repeat CXR again showed no PTX. Chest tube clamped, pending repeat CXR and removal of chest tube.    MEDICATIONS:  Pulmonary:  benzonatate 100 milliGRAM(s) Oral every 8 hours  guaiFENesin Oral Liquid (Sugar-Free) 200 milliGRAM(s) Oral every 6 hours    Antimicrobials:    Anticoagulants:    Cardiac:      Allergies    No Known Allergies    Intolerances        Vital Signs Last 24 Hrs  T(C): 36.4 (30 Sep 2024 14:21), Max: 37.4 (29 Sep 2024 21:02)  T(F): 97.5 (30 Sep 2024 14:21), Max: 99.3 (29 Sep 2024 21:02)  HR: 72 (30 Sep 2024 12:01) (70 - 100)  BP: 137/72 (30 Sep 2024 12:01) (124/63 - 200/180)  BP(mean): 95 (30 Sep 2024 12:01) (86 - 108)  RR: 20 (30 Sep 2024 00:25) (16 - 26)  SpO2: 97% (30 Sep 2024 12:01) (97% - 100%)    Parameters below as of 30 Sep 2024 12:01  Patient On (Oxygen Delivery Method): room air            PHYSICAL EXAM:  Constitutional: well-appearing  HEENT: NC/AT; PERRL, anicteric sclera; MMM  Neck: supple  Cardiovascular: +S1/S2, RRR  Respiratory: CTA B/L; no W/R/R  Gastrointestinal: soft, NT/ND  Extremities: WWP; no edema, clubbing or cyanosis  Vascular: 2+ radial pulses B/L  Neurological: awake and alert; TUTTLE    LABS:      CBC Full  -  ( 30 Sep 2024 05:30 )  WBC Count : 9.33 K/uL  RBC Count : 4.23 M/uL  Hemoglobin : 12.8 g/dL  Hematocrit : 37.9 %  Platelet Count - Automated : 181 K/uL  Mean Cell Volume : 89.6 fl  Mean Cell Hemoglobin : 30.3 pg  Mean Cell Hemoglobin Concentration : 33.8 gm/dL  Auto Neutrophil # : 7.96 K/uL  Auto Lymphocyte # : 0.70 K/uL  Auto Monocyte # : 0.60 K/uL  Auto Eosinophil # : 0.00 K/uL  Auto Basophil # : 0.01 K/uL  Auto Neutrophil % : 85.4 %  Auto Lymphocyte % : 7.5 %  Auto Monocyte % : 6.4 %  Auto Eosinophil % : 0.0 %  Auto Basophil % : 0.1 %    09-30    138  |  105  |  16  ----------------------------<  139[H]  4.5   |  23  |  0.72    Ca    9.2      30 Sep 2024 05:30  Phos  4.1     09-30  Mg     2.0     09-30      PT/INR - ( 30 Sep 2024 05:30 )   PT: 10.5 sec;   INR: 0.91                Urinalysis Basic - ( 30 Sep 2024 05:30 )    Color: x / Appearance: x / SG: x / pH: x  Gluc: 139 mg/dL / Ketone: x  / Bili: x / Urobili: x   Blood: x / Protein: x / Nitrite: x   Leuk Esterase: x / RBC: x / WBC x   Sq Epi: x / Non Sq Epi: x / Bacteria: x                RADIOLOGY & ADDITIONAL STUDIES:

## 2024-09-30 NOTE — DISCHARGE NOTE PROVIDER - PROVIDER TOKENS
PROVIDER:[TOKEN:[[id number]]] FREE:[LAST:[Suman],FIRST:[Mavis],PHONE:[phone]],FAX:[phone]],ADDRESS:[Pulmonary Disease  [address] [place] [address], [address] [place] [address]],SCHEDULEDAPPT:[10/11/2024],SCHEDULEDAPPTTIME:[11:30 AM]] PROVIDER:[TOKEN:[[id number]],SCHEDULEDAPPT:[10/17/2024],SCHEDULEDAPPTTIME:[09:00 AM]],PROVIDER:[TOKEN:[[id number]],FOLLOWUP:[1 month]]

## 2024-09-30 NOTE — DISCHARGE NOTE PROVIDER - NSDCFUADDAPPT_GEN_ALL_CORE_FT
Please bring your Insurance card, Photo ID and Discharge paperwork to the following appointment:    (1) Please follow up with your Pulmonary Medicine Provider, Dr. Mavis Will at [address] [place] [address], [address] [place] [address] on 10/11/2024 at 11:30am.    Appointment was scheduled by Ms. ROSA Gomez, Referral Coordinator.   Please call the office with any questions or concerns at [phone]    Please bring your Insurance card, Photo ID and Discharge paperwork to the following appointment:    (1) Please follow up with your Pulmonary Medicine Provider, Dr. Mavis Will at [address] [place] [address], [address] [place] [address] on 10/11/2024 at 11:30am.    Appointment was scheduled by Ms. ROSA Gomez, Referral Coordinator.

## 2024-09-30 NOTE — DISCHARGE NOTE PROVIDER - NSDCMRMEDTOKEN_GEN_ALL_CORE_FT
acetaminophen 325 mg oral tablet: 3 tab(s) orally every 6 hours as needed for  moderate pain Interchange with ibuprofen  cefpodoxime 200 mg oral tablet: 1 tab(s) orally every 12 hours  ibuprofen 400 mg oral tablet: 1 tab(s) orally every 6 hours as needed for  moderate pain Interchange with ibuprofen   acetaminophen 325 mg oral tablet: 3 tab(s) orally every 6 hours as needed for  moderate pain Interchange with ibuprofen  cefpodoxime 200 mg oral tablet: 1 tab(s) orally every 12 hours  ibuprofen 400 mg oral tablet: 1 tab(s) orally every 6 hours as needed for  moderate pain Interchange with ibuprofen  oxyCODONE 5 mg oral tablet: 1 tab(s) orally every 6 hours as needed for  severe pain MDD: 4 tabs  senna leaf extract oral tablet: 2 tab(s) orally once a day (at bedtime) as needed for  constipation

## 2024-10-01 ENCOUNTER — TRANSCRIPTION ENCOUNTER (OUTPATIENT)
Age: 24
End: 2024-10-01

## 2024-10-01 LAB
ALBUMIN SERPL ELPH-MCNC: 3.8 G/DL — SIGNIFICANT CHANGE UP (ref 3.3–5)
ALP SERPL-CCNC: 56 U/L — SIGNIFICANT CHANGE UP (ref 40–120)
ALT FLD-CCNC: 16 U/L — SIGNIFICANT CHANGE UP (ref 10–45)
ANION GAP SERPL CALC-SCNC: 12 MMOL/L — SIGNIFICANT CHANGE UP (ref 5–17)
AST SERPL-CCNC: 14 U/L — SIGNIFICANT CHANGE UP (ref 10–40)
BASOPHILS # BLD AUTO: 0.01 K/UL — SIGNIFICANT CHANGE UP (ref 0–0.2)
BASOPHILS NFR BLD AUTO: 0.1 % — SIGNIFICANT CHANGE UP (ref 0–2)
BILIRUB SERPL-MCNC: 0.5 MG/DL — SIGNIFICANT CHANGE UP (ref 0.2–1.2)
BLD GP AB SCN SERPL QL: NEGATIVE — SIGNIFICANT CHANGE UP
BUN SERPL-MCNC: 15 MG/DL — SIGNIFICANT CHANGE UP (ref 7–23)
CALCIUM SERPL-MCNC: 9.1 MG/DL — SIGNIFICANT CHANGE UP (ref 8.4–10.5)
CHLORIDE SERPL-SCNC: 104 MMOL/L — SIGNIFICANT CHANGE UP (ref 96–108)
CO2 SERPL-SCNC: 25 MMOL/L — SIGNIFICANT CHANGE UP (ref 22–31)
CREAT SERPL-MCNC: 0.92 MG/DL — SIGNIFICANT CHANGE UP (ref 0.5–1.3)
EGFR: 120 ML/MIN/1.73M2 — SIGNIFICANT CHANGE UP
EOSINOPHIL # BLD AUTO: 0.11 K/UL — SIGNIFICANT CHANGE UP (ref 0–0.5)
EOSINOPHIL NFR BLD AUTO: 1.4 % — SIGNIFICANT CHANGE UP (ref 0–6)
GLUCOSE SERPL-MCNC: 90 MG/DL — SIGNIFICANT CHANGE UP (ref 70–99)
HCT VFR BLD CALC: 39.8 % — SIGNIFICANT CHANGE UP (ref 39–50)
HGB BLD-MCNC: 13.6 G/DL — SIGNIFICANT CHANGE UP (ref 13–17)
IMM GRANULOCYTES NFR BLD AUTO: 0.9 % — SIGNIFICANT CHANGE UP (ref 0–0.9)
LYMPHOCYTES # BLD AUTO: 1.18 K/UL — SIGNIFICANT CHANGE UP (ref 1–3.3)
LYMPHOCYTES # BLD AUTO: 14.6 % — SIGNIFICANT CHANGE UP (ref 13–44)
MAGNESIUM SERPL-MCNC: 1.8 MG/DL — SIGNIFICANT CHANGE UP (ref 1.6–2.6)
MCHC RBC-ENTMCNC: 30 PG — SIGNIFICANT CHANGE UP (ref 27–34)
MCHC RBC-ENTMCNC: 34.2 GM/DL — SIGNIFICANT CHANGE UP (ref 32–36)
MCV RBC AUTO: 87.9 FL — SIGNIFICANT CHANGE UP (ref 80–100)
MONOCYTES # BLD AUTO: 0.66 K/UL — SIGNIFICANT CHANGE UP (ref 0–0.9)
MONOCYTES NFR BLD AUTO: 8.2 % — SIGNIFICANT CHANGE UP (ref 2–14)
NEUTROPHILS # BLD AUTO: 6.06 K/UL — SIGNIFICANT CHANGE UP (ref 1.8–7.4)
NEUTROPHILS NFR BLD AUTO: 74.8 % — SIGNIFICANT CHANGE UP (ref 43–77)
NRBC # BLD: 0 /100 WBCS — SIGNIFICANT CHANGE UP (ref 0–0)
PHOSPHATE SERPL-MCNC: 4 MG/DL — SIGNIFICANT CHANGE UP (ref 2.5–4.5)
PLATELET # BLD AUTO: 167 K/UL — SIGNIFICANT CHANGE UP (ref 150–400)
POTASSIUM SERPL-MCNC: 4.4 MMOL/L — SIGNIFICANT CHANGE UP (ref 3.5–5.3)
POTASSIUM SERPL-SCNC: 4.4 MMOL/L — SIGNIFICANT CHANGE UP (ref 3.5–5.3)
PROT SERPL-MCNC: 6.6 G/DL — SIGNIFICANT CHANGE UP (ref 6–8.3)
RBC # BLD: 4.53 M/UL — SIGNIFICANT CHANGE UP (ref 4.2–5.8)
RBC # FLD: 12.5 % — SIGNIFICANT CHANGE UP (ref 10.3–14.5)
RH IG SCN BLD-IMP: POSITIVE — SIGNIFICANT CHANGE UP
SODIUM SERPL-SCNC: 141 MMOL/L — SIGNIFICANT CHANGE UP (ref 135–145)
WBC # BLD: 8.09 K/UL — SIGNIFICANT CHANGE UP (ref 3.8–10.5)
WBC # FLD AUTO: 8.09 K/UL — SIGNIFICANT CHANGE UP (ref 3.8–10.5)

## 2024-10-01 PROCEDURE — 99233 SBSQ HOSP IP/OBS HIGH 50: CPT | Mod: GC

## 2024-10-01 PROCEDURE — 71045 X-RAY EXAM CHEST 1 VIEW: CPT | Mod: 26,77

## 2024-10-01 PROCEDURE — 71045 X-RAY EXAM CHEST 1 VIEW: CPT | Mod: 26

## 2024-10-01 PROCEDURE — 99221 1ST HOSP IP/OBS SF/LOW 40: CPT | Mod: 57

## 2024-10-01 RX ORDER — CHLORHEXIDINE GLUCONATE ORAL RINSE 1.2 MG/ML
10 SOLUTION DENTAL ONCE
Refills: 0 | Status: COMPLETED | OUTPATIENT
Start: 2024-10-02 | End: 2024-10-02

## 2024-10-01 RX ORDER — CHLORHEXIDINE GLUCONATE ORAL RINSE 1.2 MG/ML
1 SOLUTION DENTAL ONCE
Refills: 0 | Status: COMPLETED | OUTPATIENT
Start: 2024-10-01 | End: 2024-10-02

## 2024-10-01 RX ORDER — SODIUM CHLORIDE 0.9 % (FLUSH) 0.9 %
3 SYRINGE (ML) INJECTION EVERY 8 HOURS
Refills: 0 | Status: DISCONTINUED | OUTPATIENT
Start: 2024-10-01 | End: 2024-10-02

## 2024-10-01 RX ORDER — ENOXAPARIN SODIUM 150 MG/ML
40 INJECTION SUBCUTANEOUS EVERY 24 HOURS
Refills: 0 | Status: DISCONTINUED | OUTPATIENT
Start: 2024-10-01 | End: 2024-10-01

## 2024-10-01 RX ORDER — CHLORHEXIDINE GLUCONATE ORAL RINSE 1.2 MG/ML
1 SOLUTION DENTAL ONCE
Refills: 0 | Status: DISCONTINUED | OUTPATIENT
Start: 2024-10-01 | End: 2024-10-04

## 2024-10-01 RX ORDER — CEFTRIAXONE SODIUM 1 G
2000 VIAL (EA) INJECTION EVERY 24 HOURS
Refills: 0 | Status: DISCONTINUED | OUTPATIENT
Start: 2024-10-01 | End: 2024-10-02

## 2024-10-01 RX ADMIN — BENZONATATE 100 MILLIGRAM(S): 150 CAPSULE ORAL at 14:15

## 2024-10-01 RX ADMIN — Medication 650 MILLIGRAM(S): at 11:44

## 2024-10-01 RX ADMIN — Medication 650 MILLIGRAM(S): at 01:00

## 2024-10-01 RX ADMIN — Medication 650 MILLIGRAM(S): at 00:00

## 2024-10-01 RX ADMIN — Medication 650 MILLIGRAM(S): at 23:17

## 2024-10-01 RX ADMIN — Medication 100 MILLIGRAM(S): at 18:37

## 2024-10-01 RX ADMIN — Medication 650 MILLIGRAM(S): at 06:44

## 2024-10-01 RX ADMIN — Medication 400 MILLIGRAM(S): at 10:37

## 2024-10-01 RX ADMIN — GUAIFENESIN 200 MILLIGRAM(S): 100 SOLUTION ORAL at 05:47

## 2024-10-01 RX ADMIN — Medication 650 MILLIGRAM(S): at 23:49

## 2024-10-01 RX ADMIN — Medication 650 MILLIGRAM(S): at 18:37

## 2024-10-01 RX ADMIN — BENZONATATE 100 MILLIGRAM(S): 150 CAPSULE ORAL at 05:48

## 2024-10-01 RX ADMIN — BENZONATATE 100 MILLIGRAM(S): 150 CAPSULE ORAL at 23:17

## 2024-10-01 RX ADMIN — Medication 400 MILLIGRAM(S): at 09:44

## 2024-10-01 RX ADMIN — GUAIFENESIN 200 MILLIGRAM(S): 100 SOLUTION ORAL at 00:00

## 2024-10-01 RX ADMIN — GUAIFENESIN 200 MILLIGRAM(S): 100 SOLUTION ORAL at 11:44

## 2024-10-01 RX ADMIN — GUAIFENESIN 200 MILLIGRAM(S): 100 SOLUTION ORAL at 23:17

## 2024-10-01 RX ADMIN — Medication 3 MILLILITER(S): at 22:20

## 2024-10-01 RX ADMIN — Medication 5000 UNIT(S): at 23:16

## 2024-10-01 RX ADMIN — Medication 650 MILLIGRAM(S): at 05:48

## 2024-10-01 RX ADMIN — GUAIFENESIN 200 MILLIGRAM(S): 100 SOLUTION ORAL at 18:37

## 2024-10-01 NOTE — CONSULT NOTE ADULT - SUBJECTIVE AND OBJECTIVE BOX
PULMONARY SERVICE INITIAL CONSULT NOTE    HPI:  24 y.o. male with childhood asthma who presented to [facility] ED complaining of a sudden sharp pain in right lateral chest as well as difficulty breathing following a severe bout of coughing earlier today. Patient was in usual state of health until 12 days ago, when he started to develop a productive cough w/ yellow sputum. Patient also noted mild fevers to 100.3 at home, without any other associated symptoms. Patient continued to cough for 12 days, did not test for flu or covid. Earlier today, while watching tv at rest, patient was coughing and experienced severe R lower chest pain which has since migrated to sternum. Associated with progressively worsening dyspnea. He vapes recreationally, but not recently, no tobacco use or other substance use.   He is relatively tall at 6'2. Not on any medications currently. No Marfanoid habitus.         REVIEW OF SYSTEMS:  Constitutional: No fever, weight loss or fatigue  Eyes: No eye pain, visual disturbances, or discharge  ENMT:  No difficulty hearing, tinnitus, vertigo; No sinus or throat pain  Neck: No pain, stiffness or neck swelling  Respiratory: see HPI  Cardiovascular: No chest pain, palpitations, dizziness or leg swelling  Gastrointestinal: No abdominal or epigastric pain. No nausea, vomiting or hematemesis; No diarrhea or constipation. No melena or hematochezia.  Genitourinary: No dysuria, frequency, hematuria or incontinence  Neurological: No headaches, memory loss, loss of strength, numbness or tremors    PAST MEDICAL & SURGICAL HISTORY:      FAMILY HISTORY:      SOCIAL HISTORY:  Smoking Status: E cig  Pack Years:    MEDICATIONS:  Pulmonary:    Antimicrobials:    Anticoagulants:    Onc:    GI/:    Endocrine:    Cardiac:    Other Medications:  acetaminophen     Tablet .. 650 milliGRAM(s) Oral every 6 hours PRN  ketorolac   Injectable 15 milliGRAM(s) IV Push every 6 hours PRN  ketorolac   Injectable 30 milliGRAM(s) IV Push every 6 hours PRN      Allergies    No Known Allergies    Intolerances        Vital Signs Last 24 Hrs  T(C): 37.2 (29 Sep 2024 18:57), Max: 37.2 (29 Sep 2024 18:57)  T(F): 98.9 (29 Sep 2024 18:57), Max: 98.9 (29 Sep 2024 18:57)  HR: 78 (29 Sep 2024 18:45) (78 - 100)  BP: 138/78 (29 Sep 2024 18:45) (127/72 - 200/180)  BP(mean): 104 (29 Sep 2024 18:45) (104 - 104)  RR: 16 (29 Sep 2024 18:45) (16 - 26)  SpO2: 97% (29 Sep 2024 18:45) (97% - 100%)    Parameters below as of 29 Sep 2024 18:45  Patient On (Oxygen Delivery Method): mask, nonrebreather  O2 Flow (L/min): 15          PHYSICAL EXAM:  Constitutional: well-appearing  Head: NC/AT  EENT: PERRL, anicteric sclera; oropharynx clear, MMM  Neck: supple, no appreciable JVD  Respiratory: Improved air entry R side  Cardiovascular: +S1/S2, RRR  Gastrointestinal: soft, NT/ND  Extremities: WWP; no edema, clubbing or cyanosis  Vascular: 2+ radial pulses B/L  Neurological: awake and alert; TUTTLE    LABS:      CBC Full  -  ( 29 Sep 2024 15:29 )  WBC Count : 9.49 K/uL  RBC Count : 5.00 M/uL  Hemoglobin : 14.5 g/dL  Hematocrit : 43.1 %  Platelet Count - Automated : 225 K/uL  Mean Cell Volume : 86.2 fl  Mean Cell Hemoglobin : 29.0 pg  Mean Cell Hemoglobin Concentration : 33.6 gm/dL  Auto Neutrophil # : 7.11 K/uL  Auto Lymphocyte # : 1.36 K/uL  Auto Monocyte # : 0.74 K/uL  Auto Eosinophil # : 0.22 K/uL  Auto Basophil # : 0.02 K/uL  Auto Neutrophil % : 75.0 %  Auto Lymphocyte % : 14.3 %  Auto Monocyte % : 7.8 %  Auto Eosinophil % : 2.3 %  Auto Basophil % : 0.2 %    09-29    141  |  106  |  15  ----------------------------<  101[H]  4.5   |  24  |  0.86    Ca    9.2      29 Sep 2024 15:29            Urinalysis Basic - ( 29 Sep 2024 15:29 )    Color: x / Appearance: x / SG: x / pH: x  Gluc: 101 mg/dL / Ketone: x  / Bili: x / Urobili: x   Blood: x / Protein: x / Nitrite: x   Leuk Esterase: x / RBC: x / WBC x   Sq Epi: x / Non Sq Epi: x / Bacteria: x                RADIOLOGY & ADDITIONAL STUDIES:
Surgeon/Attending MD: Nile    Requesting Physician: Francois    HISTORY OF PRESENT ILLNESS (Need 4):    This is a 24 y.o. male PMH of childhood asthma who presents to [facility] ED complaining of a sudden sharp pain in right lateral chest as well as difficulty breathing following a severe bout of coughing earlier today. Patient was in usual state of health until 12 days ago, when he started to develop a productive cough w/ yellow sputum. Patient also noted mild fevers to 100.3 at home, without any other associated symptoms. Patient continued to cough for 12 days, did not test for flu or covid. Earlier today, patient was coughing and experienced severe R lower chest pain which has since migrated to sternum. Upon arrival, pt found to have a large R PTX. Pigtail placed emergently in ER by pulmonology. Pt admitted to medicine service and has undergone 3 waterseal trials with recurrence of ptx. Thoracic surgery consulted for evaluation for decortication.     PAST MEDICAL & SURGICAL HISTORY:      MEDICATIONS  (STANDING):  acetaminophen     Tablet .. 650 milliGRAM(s) Oral every 6 hours  benzonatate 100 milliGRAM(s) Oral every 8 hours  guaiFENesin Oral Liquid (Sugar-Free) 200 milliGRAM(s) Oral every 6 hours  hydrocodone/homatropine Syrup 5 milliLiter(s) Oral every 4 hours    MEDICATIONS  (PRN):  HYDROmorphone  Injectable 0.25 milliGRAM(s) IV Push every 6 hours PRN Severe Pain (7 - 10)  ibuprofen  Tablet. 400 milliGRAM(s) Oral four times a day PRN Moderate Pain (4 - 6)      Allergies    No Known Allergies    Intolerances        SOCIAL HISTORY:  Smoker: occasional vaping/cigarette use      FAMILY HISTORY:      Review of Systems (Need 10):  CONSTITUTIONAL: Denies fevers / chills, sweats, fatigue, weight loss, weight gain                                       NEURO:  Denies parathesias, seizures, syncope, confusion                                                                                  EYES:  Denies blurry vision, discharge, pain, loss of vision                                                                                    ENMT:  Denies difficulty hearing, vertigo, dysphagia, epistaxis, recent dental work                                       CV:  Denies chest pain, palpitations, NI, orthopnea                                                                                           RESPIRATORY:  Denies wWheezing, SOB, cough / sputum, hemoptysis                                                               GI:  Denies nausea, vomiting, diarrhea, constipation, melena                                                                          : Denies hematuria, dysuria, urgency, incontinence                                                                                          MUSKULOSKELETAL:  Denies arthritis, joint swelling, muscle weakness                                                             SKIN/BREAST:  Denies rash, itching, hair loss, masses                                                                                              PSYCH:  Denies depression, anxiety, suicidal ideation                                                                                                HEME/LYMPH:  Denies bruises easily, enlarged lymph nodes, tender lymph nodes                                          ENDOCRINE:  Denies cold intolerance, heat intolerance, polydipsia                                                                      Vital Signs Last 24 Hrs  T(C): 36.7 (01 Oct 2024 14:11), Max: 37.4 (30 Sep 2024 18:00)  T(F): 98.1 (01 Oct 2024 14:11), Max: 99.3 (30 Sep 2024 18:00)  HR: 68 (01 Oct 2024 11:45) (60 - 86)  BP: 125/74 (01 Oct 2024 11:45) (116/61 - 143/64)  BP(mean): 90 (01 Oct 2024 11:45) (82 - 103)  RR: 18 (01 Oct 2024 11:45) (16 - 18)  SpO2: 96% (01 Oct 2024 11:45) (94% - 98%)    Parameters below as of 01 Oct 2024 11:45  Patient On (Oxygen Delivery Method): room air      PHYSICAL EXAM:  General: resting comfortably in bed in NAD  Neurological: AOx3. Motor skills grossly intact  Cardiovascular: Normal S1/S2. Regular rate/rhythm. No murmurs  Respiratory: Lungs CTA bilaterally. No wheezing or rales  Gastrointestinal: +BS in all 4 quadrants. Non-distended. Soft. Non-tender  Extremities: Strength 5/5 b/l upper/lower extremities. Sensation grossly intact upper/lower extremities. No edema. No calf tenderness.  Vascular: Radial 2+bilaterally, DP 2+ b/l  Incision Sites: none                                                            LABS:                        13.6   8.09  )-----------( 167      ( 01 Oct 2024 11:40 )             39.8     10-01    141  |  104  |  15  ----------------------------<  90  4.4   |  25  |  0.92    Ca    9.1      01 Oct 2024 11:40  Phos  4.0     10-01  Mg     1.8     10-01    TPro  6.6  /  Alb  3.8  /  TBili  0.5  /  DBili  x   /  AST  14  /  ALT  16  /  AlkPhos  56  10-01    PT/INR - ( 30 Sep 2024 05:30 )   PT: 10.5 sec;   INR: 0.91            Urinalysis Basic - ( 01 Oct 2024 11:40 )    Color: x / Appearance: x / SG: x / pH: x  Gluc: 90 mg/dL / Ketone: x  / Bili: x / Urobili: x   Blood: x / Protein: x / Nitrite: x   Leuk Esterase: x / RBC: x / WBC x   Sq Epi: x / Non Sq Epi: x / Bacteria: x              RADIOLOGY & ADDITIONAL STUDIES:

## 2024-10-01 NOTE — PROGRESS NOTE ADULT - SUBJECTIVE AND OBJECTIVE BOX
**INCOMPLETE NOTE    INTERVAL/OVERNIGHT EVENTS: None    SUBJECTIVE:  Patient seen and examined at bedside, comfortable, NAD. Denied fever, chest pain, dyspnea, abdominal pain.     Vital Signs Last 12 Hrs  T(F): 98.5 (10-01-24 @ 05:06), Max: 99.3 (09-30-24 @ 22:09)  HR: 62 (10-01-24 @ 04:25) (62 - 86)  BP: 116/61 (10-01-24 @ 04:25) (116/61 - 143/64)  BP(mean): 82 (10-01-24 @ 04:25) (82 - 93)  RR: 16 (10-01-24 @ 04:25) (16 - 18)  SpO2: 94% (10-01-24 @ 04:25) (94% - 98%)  I&O's Summary    30 Sep 2024 07:01  -  01 Oct 2024 06:28  --------------------------------------------------------  IN: 0 mL / OUT: 1505 mL / NET: -1505 mL        PHYSICAL EXAM:  General: NAD  HEENT: PERRL, EOM intact, sclera anicteric, MMM  Cardiovascular: RRR; no MRG; no JVD  Respiratory: CTAB; no WRR  GI/: soft; NTND; BS x4  Extremities: WWP; 2+ peripheral pulses bilaterally; no LE edema  Skin: normal color & turgor; no rash  Neurologic: aox3; no focal deficits    LABS:                        12.8   9.33  )-----------( 181      ( 30 Sep 2024 05:30 )             37.9     09-30    138  |  105  |  16  ----------------------------<  139[H]  4.5   |  23  |  0.72    Ca    9.2      30 Sep 2024 05:30  Phos  4.1     09-30  Mg     2.0     09-30      PT/INR - ( 30 Sep 2024 05:30 )   PT: 10.5 sec;   INR: 0.91            Urinalysis Basic - ( 30 Sep 2024 05:30 )    Color: x / Appearance: x / SG: x / pH: x  Gluc: 139 mg/dL / Ketone: x  / Bili: x / Urobili: x   Blood: x / Protein: x / Nitrite: x   Leuk Esterase: x / RBC: x / WBC x   Sq Epi: x / Non Sq Epi: x / Bacteria: x          RADIOLOGY & ADDITIONAL TESTS:    MEDICATIONS  (STANDING):  acetaminophen     Tablet .. 650 milliGRAM(s) Oral every 6 hours  benzonatate 100 milliGRAM(s) Oral every 8 hours  guaiFENesin Oral Liquid (Sugar-Free) 200 milliGRAM(s) Oral every 6 hours    MEDICATIONS  (PRN):  HYDROmorphone  Injectable 0.25 milliGRAM(s) IV Push every 6 hours PRN Severe Pain (7 - 10)  ketorolac   Injectable 15 milliGRAM(s) IV Push every 6 hours PRN Moderate Pain (4 - 6)   INTERVAL/OVERNIGHT EVENTS: Patient on water seal o/n without complaints of pain     SUBJECTIVE:  Patient seen and examined at bedside, comfortable, NAD. Denied fever, chest pain, dyspnea, abdominal pain.     Vital Signs Last 12 Hrs  T(F): 98.5 (10-01-24 @ 05:06), Max: 99.3 (09-30-24 @ 22:09)  HR: 62 (10-01-24 @ 04:25) (62 - 86)  BP: 116/61 (10-01-24 @ 04:25) (116/61 - 143/64)  BP(mean): 82 (10-01-24 @ 04:25) (82 - 93)  RR: 16 (10-01-24 @ 04:25) (16 - 18)  SpO2: 94% (10-01-24 @ 04:25) (94% - 98%)  I&O's Summary    30 Sep 2024 07:01  -  01 Oct 2024 06:28  --------------------------------------------------------  IN: 0 mL / OUT: 1505 mL / NET: -1505 mL        PHYSICAL EXAM:  General: NAD  HEENT: PERRL, EOM intact, sclera anicteric, MMM  Cardiovascular: RRR; no MRG; no JVD  Respiratory: CTAB; no WRR; chest tube in place  GI/: soft; NTND; BS x4  Extremities: WWP; 2+ peripheral pulses bilaterally; no LE edema  Skin: normal color & turgor; no rash  Neurologic: aox3; no focal deficits    LABS:                        12.8   9.33  )-----------( 181      ( 30 Sep 2024 05:30 )             37.9     09-30    138  |  105  |  16  ----------------------------<  139[H]  4.5   |  23  |  0.72    Ca    9.2      30 Sep 2024 05:30  Phos  4.1     09-30  Mg     2.0     09-30      PT/INR - ( 30 Sep 2024 05:30 )   PT: 10.5 sec;   INR: 0.91            Urinalysis Basic - ( 30 Sep 2024 05:30 )    Color: x / Appearance: x / SG: x / pH: x  Gluc: 139 mg/dL / Ketone: x  / Bili: x / Urobili: x   Blood: x / Protein: x / Nitrite: x   Leuk Esterase: x / RBC: x / WBC x   Sq Epi: x / Non Sq Epi: x / Bacteria: x          RADIOLOGY & ADDITIONAL TESTS:    MEDICATIONS  (STANDING):  acetaminophen     Tablet .. 650 milliGRAM(s) Oral every 6 hours  benzonatate 100 milliGRAM(s) Oral every 8 hours  guaiFENesin Oral Liquid (Sugar-Free) 200 milliGRAM(s) Oral every 6 hours    MEDICATIONS  (PRN):  HYDROmorphone  Injectable 0.25 milliGRAM(s) IV Push every 6 hours PRN Severe Pain (7 - 10)  ketorolac   Injectable 15 milliGRAM(s) IV Push every 6 hours PRN Moderate Pain (4 - 6)

## 2024-10-01 NOTE — PROGRESS NOTE ADULT - ATTENDING COMMENTS
Patient with lung expanded on morning xr but did have some slight air leak on coughing. CXR showed re-expansion of pneumothorax after second clamping trial. Does have two areas of blebs so thoracic surgery contacted and planning for pleurodesis with blebectomy. Given the cough and sputum production with slight consolidation on CT scan will plan to give CAP coverage for 5 days as well.
Pt not tolerating clamping of chest tube and to go for blebectomy with Dr. Dowd, thoracic surgery. Appreciate the pulmonary consult input for cough suppression and treatment of possible CAP.
Pt comfortable on RA and CT placed to suction overnight. CT clamped this Am and pneumothorax recurred. To place to water seal and check CXR. If pneumo recurs to go back to suction. discussed with pulm and pts mother.

## 2024-10-01 NOTE — PROGRESS NOTE ADULT - SUBJECTIVE AND OBJECTIVE BOX
PULMONARY CONSULT SERVICE FOLLOW-UP NOTE    INTERVAL HPI:  Reviewed chart and overnight events; patient seen and examined at bedside.  Feels fairly comfortable, no chest pain.   Still having cough with phlegm.   Chest tube place don waterseal overnight, persistent air leak in am.   Attempted suctioning, then repeat clamping trial. Pneumothorax reexpanded.   Discussed surgical treatment options, thoracic surgery consulted.     MEDICATIONS:  Pulmonary:  benzonatate 100 milliGRAM(s) Oral every 8 hours  guaiFENesin Oral Liquid (Sugar-Free) 200 milliGRAM(s) Oral every 6 hours  hydrocodone/homatropine Syrup 5 milliLiter(s) Oral every 4 hours    Antimicrobials:  cefTRIAXone   IVPB 2000 milliGRAM(s) IV Intermittent every 24 hours    Anticoagulants:    Cardiac:      Allergies    No Known Allergies    Intolerances        Vital Signs Last 24 Hrs  T(C): 37.6 (01 Oct 2024 17:00), Max: 37.6 (01 Oct 2024 17:00)  T(F): 99.6 (01 Oct 2024 17:00), Max: 99.6 (01 Oct 2024 17:00)  HR: 88 (01 Oct 2024 16:10) (60 - 88)  BP: 135/63 (01 Oct 2024 16:10) (116/61 - 143/64)  BP(mean): 90 (01 Oct 2024 16:10) (82 - 93)  RR: 18 (01 Oct 2024 16:10) (16 - 18)  SpO2: 96% (01 Oct 2024 16:10) (94% - 98%)    Parameters below as of 01 Oct 2024 16:10  Patient On (Oxygen Delivery Method): room air        09-30 @ 07:01  -  10-01 @ 07:00  --------------------------------------------------------  IN: 0 mL / OUT: 1505 mL / NET: -1505 mL          PHYSICAL EXAM:  Constitutional: NAD, on room air. Chest tube at bedside. Normal tidaling. Leak present.   HEENT: NC/AT; PERRL, anicteric sclera; MMM  Neck: supple  Cardiovascular: +S1/S2, RRR  Respiratory: CTA B/L; no W/R/R  Gastrointestinal: soft, NT/ND  Extremities: WWP; no edema, clubbing or cyanosis  Vascular: 2+ radial pulses B/L  Neurological: awake and alert; TUTTLE    LABS:      CBC Full  -  ( 01 Oct 2024 11:40 )  WBC Count : 8.09 K/uL  RBC Count : 4.53 M/uL  Hemoglobin : 13.6 g/dL  Hematocrit : 39.8 %  Platelet Count - Automated : 167 K/uL  Mean Cell Volume : 87.9 fl  Mean Cell Hemoglobin : 30.0 pg  Mean Cell Hemoglobin Concentration : 34.2 gm/dL  Auto Neutrophil # : 6.06 K/uL  Auto Lymphocyte # : 1.18 K/uL  Auto Monocyte # : 0.66 K/uL  Auto Eosinophil # : 0.11 K/uL  Auto Basophil # : 0.01 K/uL  Auto Neutrophil % : 74.8 %  Auto Lymphocyte % : 14.6 %  Auto Monocyte % : 8.2 %  Auto Eosinophil % : 1.4 %  Auto Basophil % : 0.1 %    10-01    141  |  104  |  15  ----------------------------<  90  4.4   |  25  |  0.92    Ca    9.1      01 Oct 2024 11:40  Phos  4.0     10-01  Mg     1.8     10-01    TPro  6.6  /  Alb  3.8  /  TBili  0.5  /  DBili  x   /  AST  14  /  ALT  16  /  AlkPhos  56  10-01    PT/INR - ( 30 Sep 2024 05:30 )   PT: 10.5 sec;   INR: 0.91                Urinalysis Basic - ( 01 Oct 2024 11:40 )    Color: x / Appearance: x / SG: x / pH: x  Gluc: 90 mg/dL / Ketone: x  / Bili: x / Urobili: x   Blood: x / Protein: x / Nitrite: x   Leuk Esterase: x / RBC: x / WBC x   Sq Epi: x / Non Sq Epi: x / Bacteria: x                RADIOLOGY & ADDITIONAL STUDIES:

## 2024-10-01 NOTE — PROGRESS NOTE ADULT - ASSESSMENT
23 y/o M with cough for 12 days, presented with severe R chest pain and progressive dyspnea after a coughing bout earlier today. Childhood asthma and recreational vaping, otherwise no significant medical history. Pulmonary consulted for large R pneumothorax. At bedside patient noted to be heavily labored work of breathing. R chest tube placed in 2nd rib space, tolerated well. CT chest showed a few blebs and mild subq emphysema.     #Primary spontaneous pneumothorax  In a tall young male following a coughing bout, likely 2/2 viral URI and subpleural bleb rupture, which is the typical phenotype. PTX has resolved with chest tube to suction, however failed clamping trial twice and now at 48 hour gerhard.  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     - leave chest tube to suction for 24 hours  - repeat CXR in AM  - following with thoracic surgery, will need definitive blebectomy and pleurodesis   - given ongoing cough for 2 weeks with phlegm, start standing hycodan and add CAP coverage with ceftriaxone, azithromycin for 5 days     Patient was seen, evaluated and discussed with PCCM attending.    Shahab Rapp MD  PCCM Fellow, PGY-5    Please call ext [phone] or page pulmonary if there are any questions with above recommendations.

## 2024-10-01 NOTE — CONSULT NOTE ADULT - NS ATTEND AMEND GEN_ALL_CORE FT
Patient seen and examined. EMR and imaging reviewed. Patient has an ongoing air leak on exam. Discussed surgery, specifically wedge resection and pleurodesis. Discussed risks, benefits and alternatives with the patient and his mom. They agreed to proceed with surgery. We will plan for tomorrow. We will transfer to the surgical service this evening. Discussed with primary team and pulmonology. All are in agreement.     Jese Dowd MD  Thoracic Surgery

## 2024-10-01 NOTE — CONSULT NOTE ADULT - ASSESSMENT
This is a 24 y.o. male PMH of childhood asthma who presents to [facility] ED complaining of a sudden sharp pain in right lateral chest as well as difficulty breathing following a severe bout of coughing earlier today. Patient was in usual state of health until 12 days ago, when he started to develop a productive cough w/ yellow sputum. Patient also noted mild fevers to 100.3 at home, without any other associated symptoms. Patient continued to cough for 12 days, did not test for flu or covid. Earlier today, patient was coughing and experienced severe R lower chest pain which has since migrated to sternum. Upon arrival, pt found to have a large R PTX. Pigtail placed emergently in ER by pulmonology. Pt admitted to medicine service and has undergone 3 waterseal trials with recurrence of ptx. Thoracic surgery consulted for evaluation for decortication.     **INCOMPLETE NOTE**    Plan:  Problem 1: PTX        Problem 2: cough  -c/w tessalon perles  -c/w robitussin  -care per primary team        I have reviewed clinical labs tests and reports, radiology tests and reports, as well as old patient medical records, and discussed with the refering physician.     This is a 24 y.o. male PMH of childhood asthma who presents to [facility] ED complaining of a sudden sharp pain in right lateral chest as well as difficulty breathing following a severe bout of coughing earlier today. Patient was in usual state of health until 12 days ago, when he started to develop a productive cough w/ yellow sputum. Patient also noted mild fevers to 100.3 at home, without any other associated symptoms. Patient continued to cough for 12 days, did not test for flu or covid. Earlier today, patient was coughing and experienced severe R lower chest pain which has since migrated to sternum. Upon arrival, pt found to have a large R PTX. Pigtail placed emergently in ER by pulmonology. Pt admitted to medicine service and has undergone 3 waterseal trials with recurrence of ptx. Thoracic surgery consulted for evaluation for decortication.       Plan:  Problem 1: PTX  -will transfer pt to care of thoracic surgery team per Dr. Dowd  -please keep CT to suction overnight  -plan for pleurodesis tomorrow  -NPO after midnight      Problem 2: cough  -c/w tessalon perles  -c/w robitussin  -care per primary team        I have reviewed clinical labs tests and reports, radiology tests and reports, as well as old patient medical records, and discussed with the refering physician.

## 2024-10-01 NOTE — PROGRESS NOTE ADULT - ASSESSMENT
This is a 24 y.o. male PMH of childhood asthma who complains of 1 day of sudden sharp pain in right lateral chest as well as difficulty breathing in the setting of 12 days of productive cough and mild fevers found to have large R PTX on chest X-ray admitted to [facility] for monitoring s/p chest tube placement in the ED.     NEURO   BERNARDINO     CVS   BERNARDINO     PULM   #R Pneumothorax   CXR in ED significant for large R PTX   CT: Small right apical pneumothorax which is significantly improved from earlier same day chest radiograph status post right chest tube placement. A communicating apical right upper lobe bleb is likely causal.  Small lingular groundglass opacities nonspecific and may be infectious versus inflammatoryRepeat CXR s/p chest tube: small apical R PTX   Repeat CXR AM: interval improvement of small apical R PTX, new trace L pleural effusion  - s/p chest tube placement in ED, lung re-expanded immediately   - keep chest tube to suction o/n at -20   - s/p change to water seal followed by clamping trial and removal if no progression/recurrence at timed intervals  - Pain Regimen:     - tylenol for mild pain      - Toradol 15 for moderate pain      - toradol 30 for severe pain   - standing tessalon perle and robitussen for cough     GI/   BERNARDINO    RENAL   BERNARDINO    ENDO   BERNARDINO    MSK   BERNARDINO    PPX   F: none  E: replete as needed  N: regular diet   VTE ppx: Lovenox 40 SubQ  GI ppx: none   Dispo: [facility]  Code status: FULL CODE   This is a 24 y.o. male PMH of childhood asthma who complains of 1 day of sudden sharp pain in right lateral chest as well as difficulty breathing in the setting of 12 days of productive cough and mild fevers found to have large R PTX on chest X-ray admitted to [facility] for monitoring s/p chest tube placement in the ED.     NEURO   BERNARDINO     CVS   BERNARDINO     PULM   #R Pneumothorax   CXR in ED significant for large R PTX   CT: Small right apical pneumothorax which is significantly improved from earlier same day chest radiograph status post right chest tube placement. A communicating apical right upper lobe bleb is likely causal.  Small lingular groundglass opacities nonspecific and may be infectious versus inflammatoryRepeat CXR s/p chest tube: small apical R PTX   Repeat CXR AM: interval improvement of small apical R PTX, new trace L pleural effusion  - s/p chest tube placement in ED, lung re-expanded immediately   - keep chest tube to suction o/n at -20   - s/p change to water seal followed by clamping trial and removal if no progression/recurrence at timed intervals  - Pain Regimen:     - tylenol q6     - Ibuprofen 400 q6 PRN for moderate pain     - Dilaudid 0.25 q6 PRN for severe  - standing tessalon perle and robitussen for cough     GI/   BERNARDINO    RENAL   BERNARDINO    ENDO   BERNARDINO    MSK   BERNARDINO    PPX   F: none  E: replete as needed  N: regular diet   VTE ppx: Lovenox 40 SubQ  GI ppx: none   Dispo: [facility]  Code status: FULL CODE

## 2024-10-02 ENCOUNTER — APPOINTMENT (OUTPATIENT)
Dept: THORACIC SURGERY | Facility: HOSPITAL | Age: 24
End: 2024-10-02

## 2024-10-02 ENCOUNTER — RESULT REVIEW (OUTPATIENT)
Age: 24
End: 2024-10-02

## 2024-10-02 LAB
ANION GAP SERPL CALC-SCNC: 12 MMOL/L — SIGNIFICANT CHANGE UP (ref 5–17)
ANION GAP SERPL CALC-SCNC: 12 MMOL/L — SIGNIFICANT CHANGE UP (ref 5–17)
BASOPHILS # BLD AUTO: 0.02 K/UL — SIGNIFICANT CHANGE UP (ref 0–0.2)
BASOPHILS # BLD AUTO: 0.03 K/UL — SIGNIFICANT CHANGE UP (ref 0–0.2)
BASOPHILS NFR BLD AUTO: 0.1 % — SIGNIFICANT CHANGE UP (ref 0–2)
BASOPHILS NFR BLD AUTO: 0.2 % — SIGNIFICANT CHANGE UP (ref 0–2)
BLD GP AB SCN SERPL QL: NEGATIVE — SIGNIFICANT CHANGE UP
BUN SERPL-MCNC: 13 MG/DL — SIGNIFICANT CHANGE UP (ref 7–23)
BUN SERPL-MCNC: 15 MG/DL — SIGNIFICANT CHANGE UP (ref 7–23)
CALCIUM SERPL-MCNC: 8.8 MG/DL — SIGNIFICANT CHANGE UP (ref 8.4–10.5)
CALCIUM SERPL-MCNC: 9.3 MG/DL — SIGNIFICANT CHANGE UP (ref 8.4–10.5)
CHLORIDE SERPL-SCNC: 102 MMOL/L — SIGNIFICANT CHANGE UP (ref 96–108)
CHLORIDE SERPL-SCNC: 102 MMOL/L — SIGNIFICANT CHANGE UP (ref 96–108)
CO2 SERPL-SCNC: 25 MMOL/L — SIGNIFICANT CHANGE UP (ref 22–31)
CO2 SERPL-SCNC: 25 MMOL/L — SIGNIFICANT CHANGE UP (ref 22–31)
CREAT SERPL-MCNC: 0.88 MG/DL — SIGNIFICANT CHANGE UP (ref 0.5–1.3)
CREAT SERPL-MCNC: 0.93 MG/DL — SIGNIFICANT CHANGE UP (ref 0.5–1.3)
EGFR: 118 ML/MIN/1.73M2 — SIGNIFICANT CHANGE UP
EGFR: 124 ML/MIN/1.73M2 — SIGNIFICANT CHANGE UP
EOSINOPHIL # BLD AUTO: 0.01 K/UL — SIGNIFICANT CHANGE UP (ref 0–0.5)
EOSINOPHIL # BLD AUTO: 0.07 K/UL — SIGNIFICANT CHANGE UP (ref 0–0.5)
EOSINOPHIL NFR BLD AUTO: 0.1 % — SIGNIFICANT CHANGE UP (ref 0–6)
EOSINOPHIL NFR BLD AUTO: 0.5 % — SIGNIFICANT CHANGE UP (ref 0–6)
GLUCOSE SERPL-MCNC: 109 MG/DL — HIGH (ref 70–99)
GLUCOSE SERPL-MCNC: 144 MG/DL — HIGH (ref 70–99)
GRAM STN FLD: ABNORMAL
HCT VFR BLD CALC: 41.1 % — SIGNIFICANT CHANGE UP (ref 39–50)
HCT VFR BLD CALC: 45.2 % — SIGNIFICANT CHANGE UP (ref 39–50)
HGB BLD-MCNC: 14 G/DL — SIGNIFICANT CHANGE UP (ref 13–17)
HGB BLD-MCNC: 15 G/DL — SIGNIFICANT CHANGE UP (ref 13–17)
IMM GRANULOCYTES NFR BLD AUTO: 0.5 % — SIGNIFICANT CHANGE UP (ref 0–0.9)
IMM GRANULOCYTES NFR BLD AUTO: 0.7 % — SIGNIFICANT CHANGE UP (ref 0–0.9)
LYMPHOCYTES # BLD AUTO: 0.55 K/UL — LOW (ref 1–3.3)
LYMPHOCYTES # BLD AUTO: 1.63 K/UL — SIGNIFICANT CHANGE UP (ref 1–3.3)
LYMPHOCYTES # BLD AUTO: 11 % — LOW (ref 13–44)
LYMPHOCYTES # BLD AUTO: 3.7 % — LOW (ref 13–44)
MCHC RBC-ENTMCNC: 28.7 PG — SIGNIFICANT CHANGE UP (ref 27–34)
MCHC RBC-ENTMCNC: 29.1 PG — SIGNIFICANT CHANGE UP (ref 27–34)
MCHC RBC-ENTMCNC: 33.2 GM/DL — SIGNIFICANT CHANGE UP (ref 32–36)
MCHC RBC-ENTMCNC: 34.1 GM/DL — SIGNIFICANT CHANGE UP (ref 32–36)
MCV RBC AUTO: 84.4 FL — SIGNIFICANT CHANGE UP (ref 80–100)
MCV RBC AUTO: 87.6 FL — SIGNIFICANT CHANGE UP (ref 80–100)
MONOCYTES # BLD AUTO: 0.32 K/UL — SIGNIFICANT CHANGE UP (ref 0–0.9)
MONOCYTES # BLD AUTO: 0.55 K/UL — SIGNIFICANT CHANGE UP (ref 0–0.9)
MONOCYTES NFR BLD AUTO: 2.2 % — SIGNIFICANT CHANGE UP (ref 2–14)
MONOCYTES NFR BLD AUTO: 3.7 % — SIGNIFICANT CHANGE UP (ref 2–14)
NEUTROPHILS # BLD AUTO: 12.71 K/UL — HIGH (ref 1.8–7.4)
NEUTROPHILS # BLD AUTO: 13.54 K/UL — HIGH (ref 1.8–7.4)
NEUTROPHILS NFR BLD AUTO: 85.4 % — HIGH (ref 43–77)
NEUTROPHILS NFR BLD AUTO: 91.9 % — HIGH (ref 43–77)
NRBC # BLD: 0 /100 WBCS — SIGNIFICANT CHANGE UP (ref 0–0)
NRBC # BLD: 0 /100 WBCS — SIGNIFICANT CHANGE UP (ref 0–0)
PLATELET # BLD AUTO: 194 K/UL — SIGNIFICANT CHANGE UP (ref 150–400)
PLATELET # BLD AUTO: 216 K/UL — SIGNIFICANT CHANGE UP (ref 150–400)
POTASSIUM SERPL-MCNC: 4.4 MMOL/L — SIGNIFICANT CHANGE UP (ref 3.5–5.3)
POTASSIUM SERPL-MCNC: 4.7 MMOL/L — SIGNIFICANT CHANGE UP (ref 3.5–5.3)
POTASSIUM SERPL-SCNC: 4.4 MMOL/L — SIGNIFICANT CHANGE UP (ref 3.5–5.3)
POTASSIUM SERPL-SCNC: 4.7 MMOL/L — SIGNIFICANT CHANGE UP (ref 3.5–5.3)
RBC # BLD: 4.87 M/UL — SIGNIFICANT CHANGE UP (ref 4.2–5.8)
RBC # BLD: 5.16 M/UL — SIGNIFICANT CHANGE UP (ref 4.2–5.8)
RBC # FLD: 12 % — SIGNIFICANT CHANGE UP (ref 10.3–14.5)
RBC # FLD: 12.1 % — SIGNIFICANT CHANGE UP (ref 10.3–14.5)
RH IG SCN BLD-IMP: POSITIVE — SIGNIFICANT CHANGE UP
SODIUM SERPL-SCNC: 139 MMOL/L — SIGNIFICANT CHANGE UP (ref 135–145)
SODIUM SERPL-SCNC: 139 MMOL/L — SIGNIFICANT CHANGE UP (ref 135–145)
SPECIMEN SOURCE: SIGNIFICANT CHANGE UP
WBC # BLD: 14.74 K/UL — HIGH (ref 3.8–10.5)
WBC # BLD: 14.86 K/UL — HIGH (ref 3.8–10.5)
WBC # FLD AUTO: 14.74 K/UL — HIGH (ref 3.8–10.5)
WBC # FLD AUTO: 14.86 K/UL — HIGH (ref 3.8–10.5)

## 2024-10-02 PROCEDURE — 88307 TISSUE EXAM BY PATHOLOGIST: CPT | Mod: 26

## 2024-10-02 PROCEDURE — 32650 THORACOSCOPY W/PLEURODESIS: CPT | Mod: 59,RT

## 2024-10-02 PROCEDURE — 32666 THORACOSCOPY W/WEDGE RESECT: CPT | Mod: RT

## 2024-10-02 PROCEDURE — S2900 ROBOTIC SURGICAL SYSTEM: CPT | Mod: NC

## 2024-10-02 PROCEDURE — 32656 THORACOSCOPY W/PLEURECTOMY: CPT | Mod: AS,RT

## 2024-10-02 PROCEDURE — 32650 THORACOSCOPY W/PLEURODESIS: CPT | Mod: AS,RT,59

## 2024-10-02 PROCEDURE — 32656 THORACOSCOPY W/PLEURECTOMY: CPT | Mod: RT

## 2024-10-02 PROCEDURE — 71045 X-RAY EXAM CHEST 1 VIEW: CPT | Mod: 26

## 2024-10-02 PROCEDURE — 32666 THORACOSCOPY W/WEDGE RESECT: CPT | Mod: AS,RT

## 2024-10-02 PROCEDURE — 88305 TISSUE EXAM BY PATHOLOGIST: CPT | Mod: 26

## 2024-10-02 DEVICE — STAPLER COVIDIEN TRI-STAPLE CURVED 30MM TAN RELOAD: Type: IMPLANTABLE DEVICE | Status: FUNCTIONAL

## 2024-10-02 DEVICE — STAPLER COVIDIEN TRI-STAPLE CURVED 45MM PURPLE RELOAD: Type: IMPLANTABLE DEVICE | Status: FUNCTIONAL

## 2024-10-02 DEVICE — XI STAPLER SUREFORM RELOAD 45 BLUE: Type: IMPLANTABLE DEVICE | Status: FUNCTIONAL

## 2024-10-02 DEVICE — XI STAPLER ENDOWRIST RELOAD 30MM GREEN: Type: IMPLANTABLE DEVICE | Status: FUNCTIONAL

## 2024-10-02 DEVICE — STAPLER COVIDIEN TRI-STAPLE 60MM BLACK RELOAD: Type: IMPLANTABLE DEVICE | Status: FUNCTIONAL

## 2024-10-02 DEVICE — XI STAPLER ENDOWRIST RELOAD 30MM BLUE: Type: IMPLANTABLE DEVICE | Status: FUNCTIONAL

## 2024-10-02 DEVICE — STAPLER COVIDIEN TRI-STAPLE CURVED 60MM PURPLE RELOAD: Type: IMPLANTABLE DEVICE | Status: FUNCTIONAL

## 2024-10-02 DEVICE — XI STAPLER SUREFORM RELOAD 60 BLUE: Type: IMPLANTABLE DEVICE | Status: FUNCTIONAL

## 2024-10-02 DEVICE — STAPLER COVIDIEN TRI-STAPLE 45MM BLACK RELOAD: Type: IMPLANTABLE DEVICE | Status: FUNCTIONAL

## 2024-10-02 DEVICE — XI STAPLER ENDOWRIST RELOAD 30MM WHITE: Type: IMPLANTABLE DEVICE | Status: FUNCTIONAL

## 2024-10-02 DEVICE — CLIP APPLIER ETHICON LIGAMAX 5MM: Type: IMPLANTABLE DEVICE | Status: FUNCTIONAL

## 2024-10-02 DEVICE — LIGATING CLIPS WECK HEMOLOK POLYMER MEDIUM-LARGE (GREEN) 6: Type: IMPLANTABLE DEVICE | Status: FUNCTIONAL

## 2024-10-02 DEVICE — CHEST DRAIN THORACIC PVC 24FR STRAIGHT: Type: IMPLANTABLE DEVICE | Status: FUNCTIONAL

## 2024-10-02 DEVICE — LIGATING CLIPS WECK HORIZON SMALL-WIDE (RED) 6: Type: IMPLANTABLE DEVICE | Status: FUNCTIONAL

## 2024-10-02 DEVICE — SURGCEL 4 X 8": Type: IMPLANTABLE DEVICE | Status: FUNCTIONAL

## 2024-10-02 DEVICE — STAPLER COVIDIEN TRI-STAPLE CURVED 45MM TAN RELOAD: Type: IMPLANTABLE DEVICE | Status: FUNCTIONAL

## 2024-10-02 DEVICE — XI STAPLER ENDOWRIST RELOAD 45MM BLUE: Type: IMPLANTABLE DEVICE | Status: FUNCTIONAL

## 2024-10-02 DEVICE — XI STAPLER SUREFORM RELOAD 60 GREEN: Type: IMPLANTABLE DEVICE | Status: FUNCTIONAL

## 2024-10-02 DEVICE — XI STAPLER SUREFORM RELOAD 45 GREEN: Type: IMPLANTABLE DEVICE | Status: FUNCTIONAL

## 2024-10-02 DEVICE — STAPLER COVIDIEN TRI-STAPLE 30MM PURPLE RELOAD: Type: IMPLANTABLE DEVICE | Status: FUNCTIONAL

## 2024-10-02 DEVICE — XI STAPLER SUREFORM RELOAD 45 WHITE: Type: IMPLANTABLE DEVICE | Status: FUNCTIONAL

## 2024-10-02 DEVICE — XI STAPLER SUREFORM RELOAD 45 BLACK: Type: IMPLANTABLE DEVICE | Status: FUNCTIONAL

## 2024-10-02 DEVICE — XI STAPLER SUREFORM RELOAD 60 BLACK: Type: IMPLANTABLE DEVICE | Status: FUNCTIONAL

## 2024-10-02 RX ORDER — ACETAMINOPHEN 325 MG
975 TABLET ORAL EVERY 6 HOURS
Refills: 0 | Status: DISCONTINUED | OUTPATIENT
Start: 2024-10-02 | End: 2024-10-04

## 2024-10-02 RX ORDER — BISACODYL 5 MG/1
5 TABLET, COATED ORAL AT BEDTIME
Refills: 0 | Status: DISCONTINUED | OUTPATIENT
Start: 2024-10-02 | End: 2024-10-04

## 2024-10-02 RX ORDER — CEFTRIAXONE SODIUM 1 G
2000 VIAL (EA) INJECTION EVERY 24 HOURS
Refills: 0 | Status: DISCONTINUED | OUTPATIENT
Start: 2024-10-02 | End: 2024-10-04

## 2024-10-02 RX ORDER — SODIUM CHLORIDE IRRIG SOLUTION 0.9 %
1000 SOLUTION, IRRIGATION IRRIGATION
Refills: 0 | Status: DISCONTINUED | OUTPATIENT
Start: 2024-10-02 | End: 2024-10-04

## 2024-10-02 RX ORDER — METOPROLOL TARTRATE 50 MG
12.5 TABLET ORAL EVERY 12 HOURS
Refills: 0 | Status: DISCONTINUED | OUTPATIENT
Start: 2024-10-02 | End: 2024-10-04

## 2024-10-02 RX ORDER — ONDANSETRON HCL/PF 4 MG/2 ML
4 VIAL (ML) INJECTION ONCE
Refills: 0 | Status: DISCONTINUED | OUTPATIENT
Start: 2024-10-02 | End: 2024-10-02

## 2024-10-02 RX ORDER — HYDROMORPHONE HYDROCHLORIDE 1 MG/ML
0.5 INJECTION, SOLUTION INTRAMUSCULAR; INTRAVENOUS; SUBCUTANEOUS ONCE
Refills: 0 | Status: DISCONTINUED | OUTPATIENT
Start: 2024-10-02 | End: 2024-10-02

## 2024-10-02 RX ORDER — LIDOCAINE 50 MG/G
1 CREAM TOPICAL DAILY
Refills: 0 | Status: DISCONTINUED | OUTPATIENT
Start: 2024-10-02 | End: 2024-10-04

## 2024-10-02 RX ORDER — ACETAMINOPHEN 325 MG
1000 TABLET ORAL ONCE
Refills: 0 | Status: COMPLETED | OUTPATIENT
Start: 2024-10-02 | End: 2024-10-02

## 2024-10-02 RX ORDER — GUAIFENESIN 100 MG/5ML
200 SOLUTION ORAL EVERY 6 HOURS
Refills: 0 | Status: DISCONTINUED | OUTPATIENT
Start: 2024-10-02 | End: 2024-10-04

## 2024-10-02 RX ORDER — PANTOPRAZOLE SODIUM 40 MG/1
40 TABLET, DELAYED RELEASE ORAL
Refills: 0 | Status: DISCONTINUED | OUTPATIENT
Start: 2024-10-02 | End: 2024-10-04

## 2024-10-02 RX ORDER — OXYCODONE HYDROCHLORIDE 30 MG/1
5 TABLET, FILM COATED, EXTENDED RELEASE ORAL EVERY 6 HOURS
Refills: 0 | Status: DISCONTINUED | OUTPATIENT
Start: 2024-10-02 | End: 2024-10-04

## 2024-10-02 RX ORDER — OXYCODONE HYDROCHLORIDE 30 MG/1
10 TABLET, FILM COATED, EXTENDED RELEASE ORAL EVERY 6 HOURS
Refills: 0 | Status: DISCONTINUED | OUTPATIENT
Start: 2024-10-02 | End: 2024-10-04

## 2024-10-02 RX ORDER — SENNOSIDES 8.6 MG
2 TABLET ORAL AT BEDTIME
Refills: 0 | Status: DISCONTINUED | OUTPATIENT
Start: 2024-10-02 | End: 2024-10-04

## 2024-10-02 RX ADMIN — Medication 400 MILLIGRAM(S): at 22:04

## 2024-10-02 RX ADMIN — Medication 3 MILLILITER(S): at 14:37

## 2024-10-02 RX ADMIN — Medication 5000 UNIT(S): at 07:34

## 2024-10-02 RX ADMIN — HYDROMORPHONE HYDROCHLORIDE 0.5 MILLIGRAM(S): 1 INJECTION, SOLUTION INTRAMUSCULAR; INTRAVENOUS; SUBCUTANEOUS at 19:53

## 2024-10-02 RX ADMIN — Medication 100 MILLIGRAM(S): at 20:52

## 2024-10-02 RX ADMIN — HYDROMORPHONE HYDROCHLORIDE 0.5 MILLIGRAM(S): 1 INJECTION, SOLUTION INTRAMUSCULAR; INTRAVENOUS; SUBCUTANEOUS at 19:38

## 2024-10-02 RX ADMIN — CHLORHEXIDINE GLUCONATE ORAL RINSE 1 APPLICATION(S): 1.2 SOLUTION DENTAL at 09:01

## 2024-10-02 RX ADMIN — Medication 400 MILLIGRAM(S): at 22:05

## 2024-10-02 RX ADMIN — CHLORHEXIDINE GLUCONATE ORAL RINSE 1 APPLICATION(S): 1.2 SOLUTION DENTAL at 07:56

## 2024-10-02 RX ADMIN — Medication 400 MILLIGRAM(S): at 23:00

## 2024-10-02 RX ADMIN — HYDROMORPHONE HYDROCHLORIDE 0.25 MILLIGRAM(S): 1 INJECTION, SOLUTION INTRAMUSCULAR; INTRAVENOUS; SUBCUTANEOUS at 10:23

## 2024-10-02 RX ADMIN — GUAIFENESIN 200 MILLIGRAM(S): 100 SOLUTION ORAL at 20:52

## 2024-10-02 RX ADMIN — HYDROMORPHONE HYDROCHLORIDE 0.25 MILLIGRAM(S): 1 INJECTION, SOLUTION INTRAMUSCULAR; INTRAVENOUS; SUBCUTANEOUS at 12:06

## 2024-10-02 RX ADMIN — CHLORHEXIDINE GLUCONATE ORAL RINSE 10 MILLILITER(S): 1.2 SOLUTION DENTAL at 07:35

## 2024-10-02 RX ADMIN — Medication 1000 MILLIGRAM(S): at 23:00

## 2024-10-02 RX ADMIN — Medication 3 MILLILITER(S): at 07:09

## 2024-10-02 RX ADMIN — LIDOCAINE 1 PATCH: 50 CREAM TOPICAL at 21:01

## 2024-10-02 RX ADMIN — OXYCODONE HYDROCHLORIDE 10 MILLIGRAM(S): 30 TABLET, FILM COATED, EXTENDED RELEASE ORAL at 21:46

## 2024-10-02 RX ADMIN — OXYCODONE HYDROCHLORIDE 10 MILLIGRAM(S): 30 TABLET, FILM COATED, EXTENDED RELEASE ORAL at 20:56

## 2024-10-02 NOTE — PROGRESS NOTE ADULT - ASSESSMENT
This is a 24 y.o. male PMH of childhood asthma who presents to [facility] ED complaining of a sudden sharp pain in right lateral chest as well as difficulty breathing following a severe bout of coughing earlier today. Patient was in usual state of health until 12 days ago, when he started to develop a productive cough w/ yellow sputum. Patient also noted mild fevers to 100.3 at home, without any other associated symptoms. Patient continued to cough for 12 days, did not test for flu or covid. Earlier today, patient was coughing and experienced severe R lower chest pain which has since migrated to sternum. Upon arrival, pt found to have a large R PTX. Pigtail placed emergently in ER by pulmonology. Pt admitted to medicine service and has undergone 3 waterseal trials with recurrence of ptx. Thoracic surgery consulted for evaluation for blebectomy and pleurodesis.     Neuro:AOx3 pain management   - Continue ibuprofen, Oxy and dilaudid PRN   - consider PCA pump if pain not well controlled.     CVS: Normotensive, normal HR   - continue on tele.     Respiratory: Saturating well on RA. Spontaneous PTX s/p pigtail placement now s/p   - Wean off O2 sat > 92%  - IS and ambulation  - Chest PT.     GI: Regular diet.   - GI PPX   - Bowel regimen with Senna and Miralax     : BUN/ Creatinine. 15/0.92   - monitor I/Os.   - voiding.     Endo: FS well controlled.   - ISS     ID: WBC   - complete periop ABX   - continue to monitor fever curve     Heme: DVT PPX   - Lovenox.     Dispo plan:     Mi Villasenor PA-C   This is a 24 y.o. male PMH of childhood asthma who presents to [facility] ED complaining of a sudden sharp pain in right lateral chest as well as difficulty breathing following a severe bout of coughing earlier today. Patient was in usual state of health until 12 days ago, when he started to develop a productive cough w/ yellow sputum. Patient also noted mild fevers to 100.3 at home, without any other associated symptoms. Patient continued to cough for 12 days, did not test for flu or covid. Earlier today, patient was coughing and experienced severe R lower chest pain which has since migrated to sternum. Upon arrival, pt found to have a large R PTX. Pigtail placed emergently in ER by pulmonology. Pt admitted to medicine service and has undergone 3 waterseal trials with recurrence of ptx. Thoracic surgery consulted for evaluation for blebectomy and pleurodesis. OR today     Neuro:AOx3 pain management   - Continue ibuprofen, Oxy and Dilaudid PRN   - consider PCA pump if pain not well controlled in post op period    CVS: Normotensive, normal HR   - continue on tele.     Respiratory: Saturating well on RA. Spontaneous PTX s/p pigtail. Pre-op for Pleurodesis and blebectomy.   - Wean off O2 sat > 92%  - IS and ambulation  - Chest PT.   - continue Robitussin, tessalon amish and hycodan for cough.     GI: Regular diet.   - GI PPX   - Bowel regimen with Senna and Miralax     : BUN/ Creatinine. 15/0.92   - monitor I/Os.   - voiding.     Endo:  FS well controlled.   - ISS     ID: WBC 8.09 Afebrile.   - Ceftriaxone for PNA.   - continue to monitor fever curve     Heme: DVT PPX   - SQH      Dispo plan: OR today     Mi Villasenor PA-C

## 2024-10-02 NOTE — PROGRESS NOTE ADULT - SUBJECTIVE AND OBJECTIVE BOX
PULMONARY CONSULT SERVICE FOLLOW-UP NOTE    INTERVAL HPI:  Reviewed chart and overnight events; patient seen and examined at bedside.    MEDICATIONS:  Pulmonary:  benzonatate 100 milliGRAM(s) Oral every 8 hours  guaiFENesin Oral Liquid (Sugar-Free) 200 milliGRAM(s) Oral every 6 hours  hydrocodone/homatropine Syrup 5 milliLiter(s) Oral every 4 hours    Antimicrobials:  cefTRIAXone   IVPB 2000 milliGRAM(s) IV Intermittent every 24 hours    Anticoagulants:  heparin   Injectable 5000 Unit(s) SubCutaneous every 8 hours    Cardiac:      Allergies    No Known Allergies    Intolerances        Vital Signs Last 24 Hrs  T(C): 36.3 (02 Oct 2024 07:08), Max: 37.6 (01 Oct 2024 17:00)  T(F): 97.4 (02 Oct 2024 07:08), Max: 99.6 (01 Oct 2024 17:00)  HR: 70 (02 Oct 2024 04:10) (60 - 88)  BP: 110/59 (02 Oct 2024 04:10) (110/59 - 135/63)  BP(mean): 79 (02 Oct 2024 04:10) (79 - 96)  RR: 18 (02 Oct 2024 04:10) (18 - 18)  SpO2: 96% (02 Oct 2024 04:10) (95% - 96%)    Parameters below as of 02 Oct 2024 04:10  Patient On (Oxygen Delivery Method): room air        10-01 @ 07:01  -  10-02 @ 07:00  --------------------------------------------------------  IN: 0 mL / OUT: 900 mL / NET: -900 mL          PHYSICAL EXAM:  Constitutional: NAD  HEENT: NC/AT; PERRL, anicteric sclera; MMM  Neck: supple  Cardiovascular: +S1/S2, RRR  Respiratory: CTA B/L; no W/R/R  Gastrointestinal: soft, NT/ND  Extremities: WWP; no edema, clubbing or cyanosis  Vascular: 2+ radial pulses B/L  Neurological: awake and alert; TUTTLE    LABS:      CBC Full  -  ( 01 Oct 2024 11:40 )  WBC Count : 8.09 K/uL  RBC Count : 4.53 M/uL  Hemoglobin : 13.6 g/dL  Hematocrit : 39.8 %  Platelet Count - Automated : 167 K/uL  Mean Cell Volume : 87.9 fl  Mean Cell Hemoglobin : 30.0 pg  Mean Cell Hemoglobin Concentration : 34.2 gm/dL  Auto Neutrophil # : 6.06 K/uL  Auto Lymphocyte # : 1.18 K/uL  Auto Monocyte # : 0.66 K/uL  Auto Eosinophil # : 0.11 K/uL  Auto Basophil # : 0.01 K/uL  Auto Neutrophil % : 74.8 %  Auto Lymphocyte % : 14.6 %  Auto Monocyte % : 8.2 %  Auto Eosinophil % : 1.4 %  Auto Basophil % : 0.1 %    10-01    141  |  104  |  15  ----------------------------<  90  4.4   |  25  |  0.92    Ca    9.1      01 Oct 2024 11:40  Phos  4.0     10-01  Mg     1.8     10-01    TPro  6.6  /  Alb  3.8  /  TBili  0.5  /  DBili  x   /  AST  14  /  ALT  16  /  AlkPhos  56  10-01          Urinalysis Basic - ( 01 Oct 2024 11:40 )    Color: x / Appearance: x / SG: x / pH: x  Gluc: 90 mg/dL / Ketone: x  / Bili: x / Urobili: x   Blood: x / Protein: x / Nitrite: x   Leuk Esterase: x / RBC: x / WBC x   Sq Epi: x / Non Sq Epi: x / Bacteria: x                RADIOLOGY & ADDITIONAL STUDIES:

## 2024-10-02 NOTE — PROGRESS NOTE ADULT - ASSESSMENT
25 y/o M with cough for 12 days, presented with severe R chest pain and progressive dyspnea after a coughing bout earlier today. Childhood asthma and recreational vaping, otherwise no significant medical history. Pulmonary consulted for large R pneumothorax. At bedside patient noted to be heavily labored work of breathing. R chest tube placed in 2nd rib space, tolerated well. CT chest showed a few blebs and mild subq emphysema.     #Primary spontaneous pneumothorax  In a tall young male following a coughing bout, likely 2/2 viral URI and subpleural bleb rupture, which is the typical phenotype. PTX has resolved with chest tube to suction, however failed clamping trial twice and now at 48 hour gerhard.  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   Discussed with thoracic surgery and agree to proceed with RA VATS and wedge with pleurodesis. He remained on suction overnight.     - following with thoracic surgery, will need definitive blebectomy and pleurodesis   - given ongoing cough for 2 weeks with phlegm, start standing hycodan and add CAP coverage with ceftriaxone, azithromycin for 5 days   - pulmonary will follow    Patient was seen, evaluated and discussed with PCCM attending.    Shahab Rapp MD  PCCM Fellow, PGY-5    Please call ext [phone] or page pulmonary if there are any questions with above recommendations.

## 2024-10-02 NOTE — BRIEF OPERATIVE NOTE - NSICDXBRIEFPROCEDURE_GEN_ALL_CORE_FT
PROCEDURES:  Blebectomy, thoracoscopic, with pleurodesis 02-Oct-2024 19:24:47 R VATS RA RUL blebectomy, mechanical pleurodesis Sarah [place]   PROCEDURES:  Blebectomy, thoracoscopic, with pleurodesis 02-Oct-2024 19:24:47 R VATS RA RUL blebectomy, pleurectomy and mechanical pleurodesis Sarah [place]

## 2024-10-02 NOTE — PRE-OP CHECKLIST - WEIGHT IN KG
Left message for patient to return our call to set up an exam under anesthesia, anal biopsy, fulguration aof anal condyloma with Dr Ruiz at the [facility].  KAILA FRANKEL LPN    
Patient called and stated he did review the results on [facility] for his colonoscopy. Patient did not understand the long medical terminology words and would like to have this clarified so patient can understand what was found. Please advise.   
90.7

## 2024-10-02 NOTE — PROGRESS NOTE ADULT - SUBJECTIVE AND OBJECTIVE BOX
Patient discussed on morning rounds with Dr. Dowd     Operation / Date:     SUBJECTIVE ASSESSMENT:  24y Male         Vital Signs Last 24 Hrs  T(C): 36.8 (02 Oct 2024 09:48), Max: 37.6 (01 Oct 2024 17:00)  T(F): 98.3 (02 Oct 2024 09:48), Max: 99.6 (01 Oct 2024 17:00)  HR: 70 (02 Oct 2024 08:25) (68 - 88)  BP: 104/55 (02 Oct 2024 08:25) (104/55 - 135/63)  BP(mean): 76 (02 Oct 2024 08:25) (76 - 96)  RR: 17 (02 Oct 2024 08:25) (17 - 18)  SpO2: 95% (02 Oct 2024 08:25) (95% - 96%)    Parameters below as of 02 Oct 2024 08:25  Patient On (Oxygen Delivery Method): room air      I&O's Detail    01 Oct 2024 07:01  -  02 Oct 2024 07:00  --------------------------------------------------------  IN:  Total IN: 0 mL    OUT:    Voided (mL): 900 mL  Total OUT: 900 mL    Total NET: -900 mL          CHEST TUBE:  Yes/No. AIR LEAKS: Yes/No. Suction / H2O SEAL.   LISSETTE DRAIN:  Yes/No.  EPICARDIAL WIRES: Yes/No.  TIE DOWNS: Yes/No.  DE LOS SANTOS: Yes/No.    PHYSICAL EXAM:    General:     Neurological:    Cardiovascular:    Respiratory:    Gastrointestinal:    Extremities:    Vascular:    Incision Sites:    LABS:                        13.6   8.09  )-----------( 167      ( 01 Oct 2024 11:40 )             39.8       COUMADIN:  Yes/No. REASON: .        10-01    141  |  104  |  15  ----------------------------<  90  4.4   |  25  |  0.92    Ca    9.1      01 Oct 2024 11:40  Phos  4.0     10-01  Mg     1.8     10-01    TPro  6.6  /  Alb  3.8  /  TBili  0.5  /  DBili  x   /  AST  14  /  ALT  16  /  AlkPhos  56  10-01      Urinalysis Basic - ( 01 Oct 2024 11:40 )    Color: x / Appearance: x / SG: x / pH: x  Gluc: 90 mg/dL / Ketone: x  / Bili: x / Urobili: x   Blood: x / Protein: x / Nitrite: x   Leuk Esterase: x / RBC: x / WBC x   Sq Epi: x / Non Sq Epi: x / Bacteria: x        MEDICATIONS  (STANDING):  acetaminophen     Tablet .. 650 milliGRAM(s) Oral every 6 hours  benzonatate 100 milliGRAM(s) Oral every 8 hours  cefTRIAXone   IVPB 2000 milliGRAM(s) IV Intermittent every 24 hours  chlorhexidine 4% Liquid 1 Application(s) Topical once  chlorhexidine 4% Liquid 1 Application(s) Topical once  guaiFENesin Oral Liquid (Sugar-Free) 200 milliGRAM(s) Oral every 6 hours  heparin   Injectable 5000 Unit(s) SubCutaneous every 8 hours  hydrocodone/homatropine Syrup 5 milliLiter(s) Oral every 4 hours  sodium chloride 0.9% lock flush 3 milliLiter(s) IV Push every 8 hours    MEDICATIONS  (PRN):  HYDROmorphone  Injectable 0.25 milliGRAM(s) IV Push every 6 hours PRN Severe Pain (7 - 10)  ibuprofen  Tablet. 400 milliGRAM(s) Oral four times a day PRN Moderate Pain (4 - 6)        RADIOLOGY & ADDITIONAL TESTS:     Patient discussed on morning rounds with Dr. Dowd     Operation / Date: pre-op for Pleurodesis and blebectomy.     SUBJECTIVE ASSESSMENT:  24y Male offers no complaints this morning.         Vital Signs Last 24 Hrs  T(C): 36.8 (02 Oct 2024 09:48), Max: 37.6 (01 Oct 2024 17:00)  T(F): 98.3 (02 Oct 2024 09:48), Max: 99.6 (01 Oct 2024 17:00)  HR: 70 (02 Oct 2024 08:25) (68 - 88)  BP: 104/55 (02 Oct 2024 08:25) (104/55 - 135/63)  BP(mean): 76 (02 Oct 2024 08:25) (76 - 96)  RR: 17 (02 Oct 2024 08:25) (17 - 18)  SpO2: 95% (02 Oct 2024 08:25) (95% - 96%)    Parameters below as of 02 Oct 2024 08:25  Patient On (Oxygen Delivery Method): room air      I&O's Detail    01 Oct 2024 07:01  -  02 Oct 2024 07:00  --------------------------------------------------------  IN:  Total IN: 0 mL    OUT:    Voided (mL): 900 mL  Total OUT: 900 mL    Total NET: -900 mL          CHEST TUBE:  Yes/No. AIR LEAKS: Yes/No. Suction / H2O SEAL.   LISSETTE DRAIN:  No.  EPICARDIAL WIRES: No.  TIE DOWNS: No.  DE LOS SANTOS: No    PHYSICAL EXAM:    GEN: NAD, looks comfortable  Neuro: A&Ox3.  No focal deficits.  Moving all extremities.   CV: S1S2, regular, no murmurs appreciated.  No carotid bruits.  No JVD  Lungs: Clear B/L.  No wheezing, rales or rhonchi Right pigtail in place.   ABD: Soft, non-tender, non-distended.  +Bowel sounds  EXT: Warm and well perfused.  No peripheral edema noted. All Distal pulses palpable bilaterally.   Musculoskeletal: Moving all extremities with normal ROM, no joint swelling      LABS:                        13.6   8.09  )-----------( 167      ( 01 Oct 2024 11:40 )             39.8         10-01    141  |  104  |  15  ----------------------------<  90  4.4   |  25  |  0.92    Ca    9.1      01 Oct 2024 11:40  Phos  4.0     10-01  Mg     1.8     10-01    TPro  6.6  /  Alb  3.8  /  TBili  0.5  /  DBili  x   /  AST  14  /  ALT  16  /  AlkPhos  56  10-01      Urinalysis Basic - ( 01 Oct 2024 11:40 )    Color: x / Appearance: x / SG: x / pH: x  Gluc: 90 mg/dL / Ketone: x  / Bili: x / Urobili: x   Blood: x / Protein: x / Nitrite: x   Leuk Esterase: x / RBC: x / WBC x   Sq Epi: x / Non Sq Epi: x / Bacteria: x        MEDICATIONS  (STANDING):  acetaminophen     Tablet .. 650 milliGRAM(s) Oral every 6 hours  benzonatate 100 milliGRAM(s) Oral every 8 hours  cefTRIAXone   IVPB 2000 milliGRAM(s) IV Intermittent every 24 hours  chlorhexidine 4% Liquid 1 Application(s) Topical once  chlorhexidine 4% Liquid 1 Application(s) Topical once  guaiFENesin Oral Liquid (Sugar-Free) 200 milliGRAM(s) Oral every 6 hours  heparin   Injectable 5000 Unit(s) SubCutaneous every 8 hours  hydrocodone/homatropine Syrup 5 milliLiter(s) Oral every 4 hours  sodium chloride 0.9% lock flush 3 milliLiter(s) IV Push every 8 hours    MEDICATIONS  (PRN):  HYDROmorphone  Injectable 0.25 milliGRAM(s) IV Push every 6 hours PRN Severe Pain (7 - 10)  ibuprofen  Tablet. 400 milliGRAM(s) Oral four times a day PRN Moderate Pain (4 - 6)        RADIOLOGY & ADDITIONAL TESTS:

## 2024-10-03 LAB
CULTURE RESULTS: ABNORMAL
SPECIMEN SOURCE: SIGNIFICANT CHANGE UP

## 2024-10-03 PROCEDURE — 71045 X-RAY EXAM CHEST 1 VIEW: CPT | Mod: 26

## 2024-10-03 PROCEDURE — 99233 SBSQ HOSP IP/OBS HIGH 50: CPT | Mod: GC

## 2024-10-03 RX ORDER — HYDROMORPHONE HYDROCHLORIDE 1 MG/ML
0.2 INJECTION, SOLUTION INTRAMUSCULAR; INTRAVENOUS; SUBCUTANEOUS ONCE
Refills: 0 | Status: DISCONTINUED | OUTPATIENT
Start: 2024-10-03 | End: 2024-10-03

## 2024-10-03 RX ADMIN — OXYCODONE HYDROCHLORIDE 10 MILLIGRAM(S): 30 TABLET, FILM COATED, EXTENDED RELEASE ORAL at 07:40

## 2024-10-03 RX ADMIN — Medication 975 MILLIGRAM(S): at 15:19

## 2024-10-03 RX ADMIN — OXYCODONE HYDROCHLORIDE 5 MILLIGRAM(S): 30 TABLET, FILM COATED, EXTENDED RELEASE ORAL at 22:01

## 2024-10-03 RX ADMIN — Medication 12.5 MILLIGRAM(S): at 18:21

## 2024-10-03 RX ADMIN — Medication 975 MILLIGRAM(S): at 15:21

## 2024-10-03 RX ADMIN — GUAIFENESIN 200 MILLIGRAM(S): 100 SOLUTION ORAL at 15:18

## 2024-10-03 RX ADMIN — LIDOCAINE 1 PATCH: 50 CREAM TOPICAL at 06:05

## 2024-10-03 RX ADMIN — OXYCODONE HYDROCHLORIDE 5 MILLIGRAM(S): 30 TABLET, FILM COATED, EXTENDED RELEASE ORAL at 21:50

## 2024-10-03 RX ADMIN — Medication 975 MILLIGRAM(S): at 07:40

## 2024-10-03 RX ADMIN — Medication 5000 UNIT(S): at 18:21

## 2024-10-03 RX ADMIN — OXYCODONE HYDROCHLORIDE 5 MILLIGRAM(S): 30 TABLET, FILM COATED, EXTENDED RELEASE ORAL at 09:44

## 2024-10-03 RX ADMIN — Medication 975 MILLIGRAM(S): at 06:00

## 2024-10-03 RX ADMIN — Medication 400 MILLIGRAM(S): at 13:38

## 2024-10-03 RX ADMIN — LIDOCAINE 1 PATCH: 50 CREAM TOPICAL at 23:11

## 2024-10-03 RX ADMIN — GUAIFENESIN 200 MILLIGRAM(S): 100 SOLUTION ORAL at 06:01

## 2024-10-03 RX ADMIN — GUAIFENESIN 200 MILLIGRAM(S): 100 SOLUTION ORAL at 20:07

## 2024-10-03 RX ADMIN — Medication 2 TABLET(S): at 21:50

## 2024-10-03 RX ADMIN — BISACODYL 5 MILLIGRAM(S): 5 TABLET, COATED ORAL at 21:49

## 2024-10-03 RX ADMIN — LIDOCAINE 1 PATCH: 50 CREAM TOPICAL at 19:29

## 2024-10-03 RX ADMIN — Medication 12.5 MILLIGRAM(S): at 06:01

## 2024-10-03 RX ADMIN — Medication 100 MILLIGRAM(S): at 20:06

## 2024-10-03 RX ADMIN — OXYCODONE HYDROCHLORIDE 10 MILLIGRAM(S): 30 TABLET, FILM COATED, EXTENDED RELEASE ORAL at 06:26

## 2024-10-03 RX ADMIN — LIDOCAINE 1 PATCH: 50 CREAM TOPICAL at 09:00

## 2024-10-03 RX ADMIN — Medication 975 MILLIGRAM(S): at 21:00

## 2024-10-03 RX ADMIN — Medication 975 MILLIGRAM(S): at 20:07

## 2024-10-03 RX ADMIN — LIDOCAINE 1 PATCH: 50 CREAM TOPICAL at 12:15

## 2024-10-03 RX ADMIN — Medication 975 MILLIGRAM(S): at 16:00

## 2024-10-03 RX ADMIN — GUAIFENESIN 200 MILLIGRAM(S): 100 SOLUTION ORAL at 09:44

## 2024-10-03 RX ADMIN — OXYCODONE HYDROCHLORIDE 5 MILLIGRAM(S): 30 TABLET, FILM COATED, EXTENDED RELEASE ORAL at 10:30

## 2024-10-03 RX ADMIN — PANTOPRAZOLE SODIUM 40 MILLIGRAM(S): 40 TABLET, DELAYED RELEASE ORAL at 06:01

## 2024-10-03 RX ADMIN — HYDROMORPHONE HYDROCHLORIDE 0.2 MILLIGRAM(S): 1 INJECTION, SOLUTION INTRAMUSCULAR; INTRAVENOUS; SUBCUTANEOUS at 13:33

## 2024-10-03 RX ADMIN — HYDROMORPHONE HYDROCHLORIDE 0.2 MILLIGRAM(S): 1 INJECTION, SOLUTION INTRAMUSCULAR; INTRAVENOUS; SUBCUTANEOUS at 14:00

## 2024-10-03 NOTE — PROGRESS NOTE ADULT - SUBJECTIVE AND OBJECTIVE BOX
PULMONARY CONSULT SERVICE FOLLOW-UP NOTE    INTERVAL HPI:  Reviewed chart and overnight events; patient seen and examined at bedside.  No new complaints. Not short of breath. No chest pain/tightness/pressure.   Day 1 s/p RA VATS with blebectomy, wedge and pleurodesis.     MEDICATIONS:  Pulmonary:  guaiFENesin Oral Liquid (Sugar-Free) 200 milliGRAM(s) Oral every 6 hours  ipratropium    for Nebulization 500 MICROGram(s) Nebulizer every 6 hours PRN    Antimicrobials:  cefTRIAXone   IVPB 2000 milliGRAM(s) IV Intermittent every 24 hours    Anticoagulants:  heparin   Injectable 5000 Unit(s) SubCutaneous every 8 hours    Cardiac:  metoprolol tartrate 12.5 milliGRAM(s) Oral every 12 hours      Allergies    No Known Allergies    Intolerances        Vital Signs Last 24 Hrs  T(C): 36.7 (03 Oct 2024 17:06), Max: 37 (03 Oct 2024 09:19)  T(F): 98 (03 Oct 2024 17:06), Max: 98.6 (03 Oct 2024 09:19)  HR: 92 (03 Oct 2024 12:57) (66 - 106)  BP: 130/70 (03 Oct 2024 12:57) (107/61 - 143/63)  BP(mean): 94 (03 Oct 2024 12:57) (78 - 94)  RR: 17 (03 Oct 2024 12:57) (11 - 18)  SpO2: 95% (03 Oct 2024 12:57) (90% - 97%)    Parameters below as of 03 Oct 2024 12:57  Patient On (Oxygen Delivery Method): room air        10-02 @ 07:01  -  10-03 @ 07:00  --------------------------------------------------------  IN: 100 mL / OUT: 106 mL / NET: -6 mL    10-03 @ 07:01  -  10-03 @ 19:56  --------------------------------------------------------  IN: 0 mL / OUT: 50 mL / NET: -50 mL          PHYSICAL EXAM:  Constitutional: NAD  HEENT: NC/AT; PERRL, anicteric sclera; MMM  Neck: supple  Cardiovascular: +S1/S2, RRR  Respiratory: CTA B/L; no W/R/R  Gastrointestinal: soft, NT/ND  Extremities: WWP; no edema, clubbing or cyanosis  Vascular: 2+ radial pulses B/L  Neurological: awake and alert; TUTTLE    LABS:      CBC Full  -  ( 02 Oct 2024 22:36 )  WBC Count : 14.74 K/uL  RBC Count : 4.87 M/uL  Hemoglobin : 14.0 g/dL  Hematocrit : 41.1 %  Platelet Count - Automated : 194 K/uL  Mean Cell Volume : 84.4 fl  Mean Cell Hemoglobin : 28.7 pg  Mean Cell Hemoglobin Concentration : 34.1 gm/dL  Auto Neutrophil # : 13.54 K/uL  Auto Lymphocyte # : 0.55 K/uL  Auto Monocyte # : 0.55 K/uL  Auto Eosinophil # : 0.01 K/uL  Auto Basophil # : 0.02 K/uL  Auto Neutrophil % : 91.9 %  Auto Lymphocyte % : 3.7 %  Auto Monocyte % : 3.7 %  Auto Eosinophil % : 0.1 %  Auto Basophil % : 0.1 %    10-02    139  |  102  |  15  ----------------------------<  144[H]  4.4   |  25  |  0.88    Ca    8.8      02 Oct 2024 22:36            Urinalysis Basic - ( 02 Oct 2024 22:36 )    Color: x / Appearance: x / SG: x / pH: x  Gluc: 144 mg/dL / Ketone: x  / Bili: x / Urobili: x   Blood: x / Protein: x / Nitrite: x   Leuk Esterase: x / RBC: x / WBC x   Sq Epi: x / Non Sq Epi: x / Bacteria: x                RADIOLOGY & ADDITIONAL STUDIES:

## 2024-10-03 NOTE — PROGRESS NOTE ADULT - ASSESSMENT
25 y/o M with cough for 12 days, presented with severe R chest pain and progressive dyspnea after a coughing bout earlier today. Childhood asthma and recreational vaping, otherwise no significant medical history. Pulmonary consulted for large R pneumothorax. At bedside patient noted to be heavily labored work of breathing. R chest tube placed in 2nd rib space, tolerated well. CT chest showed a few blebs and mild subq emphysema.     #Primary spontaneous pneumothorax  In a tall young male following a coughing bout, likely 2/2 viral URI and subpleural bleb rupture, which is the typical phenotype. Patient now status post blebectomy with pleurodesis. Clinically CXR looks improved and on suction lung is up with minimal leak. Will plan to treat him for pneumonia given the CT changes wiht cough, plan for 5 day course.    -Chest tube as per CT surgery  -Ceftriaxone 5 days for CAP      Please call ext [phone] or page pulmonary if there are any questions with above recommendations.

## 2024-10-03 NOTE — DIETITIAN INITIAL EVALUATION ADULT - OTHER INFO
24 y.o. male PMH of childhood asthma who presents to [facility] ED complaining of a sudden sharp pain in right lateral chest as well as difficulty breathing following a severe bout of coughing earlier today. Patient was in usual state of health until 12 days ago, when he started to develop a productive cough w/ yellow sputum. Patient also noted mild fevers to 100.3 at home, without any other associated symptoms. Patient continued to cough for 12 days, did not test for flu or covid. Earlier today, patient was coughing and experienced severe R lower chest pain which has since migrated to sternum. S/p R VATS RA RUL blebectomy, pleurectomy, and mechanical pleurodesis 10/2    Patient seen for nutrition assessment. Labs and medications reviewed. Electrolytes WNL, glucose  x 24 hours. Ordered for abx, LR @ 50mL/hr, senna, protonix. No pressure injuries or edema documented, surgical incision to R upper chest. No report of GI distress, last BM 9/30 per flowsheets. Current diet order: Regular. Confirms NKFA. No difficulty chewing/swallowing reported. Reports good appetite in-house and PTA. Consumed 100% of breakfast this AM. Dosing weight: 200 pounds, BMI 25.7, reports UBW of 200 pounds and denies recent weight loss. Observed with no overt signs and symptoms of muscle or fat wasting. Based on ASPEN guidelines, pt does not meet criteria for malnutrition. Nutrition education provided. Encouraged adequate PO intake with emphasis on protein for post-op healing. Pt verbalized appreciation and understanding. See nutrition recommendations below.

## 2024-10-03 NOTE — PROGRESS NOTE ADULT - ASSESSMENT
25 YO Male w/ PMHx of childhood asthma who presents to [facility] ED complaining of a sudden sharp pain in right lateral chest as well as difficulty breathing following a severe bout of coughing earlier today. Patient was in usual state of health until 12 days ago, when he started to develop a productive cough w/ yellow sputum. Patient also noted mild fevers to 100.3 at home, without any other associated symptoms. Patient continued to cough for 12 days, did not test for flu or covid. Earlier today, patient was coughing and experienced severe R lower chest pain which has since migrated to sternum. Upon arrival, pt found to have a large R PTX. Pigtail placed emergently in ER by pulmonology. Pt admitted to medicine service and has undergone 3 waterseal trials with recurrence of ptx. Thoracic surgery consulted for evaluation for blebectomy and pleurodesis. OR today.    Plan:    Neurovascular:   -Pain well controlled with current regimen. PRN's:    Cardiovascular:   -Hemodynamically stable.   -Monitor: BP, HR, tele    Respiratory:   -Oxygenating well on room air  -Encourage continued use of IS 10x/hr and frequent ambulation  -CXR:    GI:  -GI PPX:  -PO Diet  -Bowel Regimen:     Renal / :  -Continue to monitor renal function: BUN/Cr  -Monitor I/O's daily     Endocrine:    -No hx of DM or thyroid dx  -A1c:  -TSH:    Hematologic:  -CBC: H/H-  -Coagulation Panel.    ID:  -Temperature:   -CBC: WBC-    Prophylaxis:  -DVT prophylaxis with 5000 SubQ Heparin q8h.  -Continue with SCD's b/l while patient is at rest     Disposition:  -Discharge home once patient is medically ready   23 YO Male w/ PMHx of childhood asthma who originally presented to [facility] ED c/o sudden sharp pain in right lateral chest a/w difficulty breathing following a severe bout of coughing earlier in the day. Upon arrival, pt found to have a large R PTX. Pigtail placed emergently in ER by pulmonology and pt admitted to medicine service. He failed 3 waterseal trials and thoracic surgery was consulted for surgical management. On 10/2/24 patient underwent R VATS RA RUL blebectomy, pleurectomy and mechanical pleurodesis. Patient recovered in PACU with CT x1 on suction. POD1 CT remains on suction w/o air leak.    Plan:    Neurovascular:   -Cont standing Tylenol  -Pain well controlled with current regimen. PRN's: Ibuprofen and Oxycodone    Cardiovascular:   -Cont BB for AF ppx  -Hemodynamically stable.   -Monitor: BP, HR, tele    Respiratory:   -R PTX s/p R VATS RA RUL blebectomy, pleurectomy and mechanical pleurodesis on 10/2/24  -Keep CT x 1 on suction at all times until tomorrow afternoon  -Cont Atrovent  -Oxygenating well on room air  -Encourage continued use of IS 10x/hr and frequent ambulation  -CXR: small apical R PTX    GI:  -GI PPX: Protonix  -PO Diet  -Bowel Regimen: dulcolax, senna     Renal / :  -Continue to monitor renal function: BUN/Cr: 15/0.88  -Monitor I/O's daily     Endocrine:    -No hx of DM or thyroid dx  -A1c: no obtained  -TSH: no obtained    Hematologic:  -CBC: H/H- 14/41  -Coagulation Panel.    ID:  -Pneumonia  -Cont Rocephin x 5 day course  -Temperature: Afebrile  -CBC: WBC- 14    Prophylaxis:  -DVT prophylaxis with 5000 SubQ Heparin q8h.  -Continue with SCD's b/l while patient is at rest     Disposition:  -CT management

## 2024-10-03 NOTE — PROGRESS NOTE ADULT - ASSESSMENT
23 y/o M with cough for 12 days, presented with severe R chest pain and progressive dyspnea after a coughing bout earlier today. Childhood asthma and recreational vaping, otherwise no significant medical history. Pulmonary consulted for large R pneumothorax. At bedside patient noted to be heavily labored work of breathing. R chest tube placed in 2nd rib space, tolerated well. CT chest showed a few blebs and mild subq emphysema.     #Primary spontaneous pneumothorax  In a tall young male following a coughing bout, likely 2/2 viral URI and subpleural bleb rupture, which is the typical phenotype. PTX has resolved with chest tube to suction, however failed clamping trial twice and now at 48 hour gerhard.  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   Now day 1 s/p RA VATS with blebectomy, pleurodesis, wedge. Tolerated procedure well    - chest tube management per thoracic surgery   - complete antibiotics ceftriaxone, azithromycin for 5 days total   - pulmonary will sign off    Discharge Plan:  - follow up with Dr. White with repeat CXR in 4 -6 weeks    Thank you for this consult.  Patient seen, evaluated and disucssed with PCCM attending.  Pulmonary team will sign off. Please call, page or place new consult with any new questions.     Shahab Rapp MD  PCCM Fellow

## 2024-10-03 NOTE — DIETITIAN INITIAL EVALUATION ADULT - PERTINENT LABORATORY DATA
10-02    139  |  102  |  15  ----------------------------<  144[H]  4.4   |  25  |  0.88    Ca    8.8      02 Oct 2024 22:36

## 2024-10-03 NOTE — PROGRESS NOTE ADULT - SUBJECTIVE AND OBJECTIVE BOX
Patient discussed on morning rounds with Dr. Dowd    Operation / Date: s/p R VATS RUL blebectomy, pleurectomy and mechanical pleurodesis on 10/2/24    SUBJECTIVE ASSESSMENT: Patient seen and examined at bedside. Patient admits to a lot of pain post-op yesterday, but states that it is improved today. Denies chills, chest pain, SOB, palpitations, N/V.    Vital Signs Last 24 Hrs  T(C): 36.7 (03 Oct 2024 05:01), Max: 36.8 (02 Oct 2024 09:48)  T(F): 98.1 (03 Oct 2024 05:01), Max: 98.3 (02 Oct 2024 09:48)  HR: 106 (03 Oct 2024 09:19) (54 - 110)  BP: 143/63 (03 Oct 2024 09:19) (107/61 - 143/63)  BP(mean): 90 (03 Oct 2024 09:19) (78 - 98)  RR: 18 (03 Oct 2024 09:19) (11 - 21)  SpO2: 95% (03 Oct 2024 09:19) (90% - 97%)    Parameters below as of 03 Oct 2024 05:00  Patient On (Oxygen Delivery Method): room air    I&O's Detail    02 Oct 2024 07:01  -  03 Oct 2024 07:00  --------------------------------------------------------  IN:    Lactated Ringers: 100 mL  Total IN: 100 mL    OUT:    Chest Tube (mL): 106 mL  Total OUT: 106 mL    Total NET: -6 mL    CHEST TUBE x 1 on suction, no leak  LISSETTE DRAIN: None  EPICARDIAL WIRES: None  TIE DOWNS: None  DE LOS SANTOS: None    PHYSICAL EXAM:  GENERAL: NAD, lying in bed comfortably  HEAD:  Atraumatic, Normocephalic  EYES: EOMI, PERRLA, conjunctiva and sclera clear  ENT: Moist mucous membranes  NECK: Supple, No JVD  CHEST/LUNG: CTAB; CT x 1 on suction, no leak; incisions well-approximated with steri strips  HEART: RRR  ABDOMEN: Soft, Nontender, Nondistended. No hepatomegally  EXTREMITIES:  2+ Peripheral Pulses, brisk capillary refill. No clubbing, cyanosis, or edema  NERVOUS SYSTEM:  Alert & Oriented X3, speech clear. No deficits     LABS:                        14.0   14.74 )-----------( 194      ( 02 Oct 2024 22:36 )             41.1     10-02    139  |  102  |  15  ----------------------------<  144[H]  4.4   |  25  |  0.88    Ca    8.8      02 Oct 2024 22:36  Phos  4.0     10-01  Mg     1.8     10-01    TPro  6.6  /  Alb  3.8  /  TBili  0.5  /  DBili  x   /  AST  14  /  ALT  16  /  AlkPhos  56  10-01    Urinalysis Basic - ( 02 Oct 2024 22:36 )    Color: x / Appearance: x / SG: x / pH: x  Gluc: 144 mg/dL / Ketone: x  / Bili: x / Urobili: x   Blood: x / Protein: x / Nitrite: x   Leuk Esterase: x / RBC: x / WBC x   Sq Epi: x / Non Sq Epi: x / Bacteria: x    MEDICATIONS  (STANDING):  acetaminophen     Tablet .. 975 milliGRAM(s) Oral every 6 hours  bisacodyl 5 milliGRAM(s) Oral at bedtime  cefTRIAXone   IVPB 2000 milliGRAM(s) IV Intermittent every 24 hours  chlorhexidine 4% Liquid 1 Application(s) Topical once  guaiFENesin Oral Liquid (Sugar-Free) 200 milliGRAM(s) Oral every 6 hours  heparin   Injectable 5000 Unit(s) SubCutaneous every 8 hours  lactated ringers. 1000 milliLiter(s) (50 mL/Hr) IV Continuous <Continuous>  lidocaine   4% Patch 1 Patch Transdermal daily  metoprolol tartrate 12.5 milliGRAM(s) Oral every 12 hours  pantoprazole    Tablet 40 milliGRAM(s) Oral before breakfast  senna 2 Tablet(s) Oral at bedtime    MEDICATIONS  (PRN):  ibuprofen  Tablet. 400 milliGRAM(s) Oral every 6 hours PRN Temp greater or equal to 38C (100.4F), Mild Pain (1 - 3)  ipratropium    for Nebulization 500 MICROGram(s) Nebulizer every 6 hours PRN Shortness of Breath and/or Wheezing  oxyCODONE    IR 10 milliGRAM(s) Oral every 6 hours PRN Severe Pain (7 - 10)  oxyCODONE    IR 5 milliGRAM(s) Oral every 6 hours PRN Moderate Pain (4 - 6)    RADIOLOGY & ADDITIONAL TESTS:  < from: Xray Chest 1 View- PORTABLE-Routine (Xray Chest 1 View- PORTABLE-Routine in AM.) (10.03.24 @ 06:27) >  Findings/  impression: Stable position right chest tube. Residual right apical tiny   pneumothorax. Right upper chest chain sutures. Right lower lobe discoid   atelectasis, new. Left basilar opacity/pleural effusion, increased. Heart   and mediastinum are unremarkable. Unchanged right subcutaneous emphysema..

## 2024-10-03 NOTE — PROGRESS NOTE ADULT - PROVIDER SPECIALTY LIST ADULT
CT Surgery
Pulmonology
Pulmonology
Internal Medicine
Internal Medicine
Pulmonology
Pulmonology
Thoracic Surgery
Pulmonology

## 2024-10-03 NOTE — DIETITIAN INITIAL EVALUATION ADULT - ENTER TO (ML/KG)
Patient attended Phase 2 Cardiac Rehab Exercise Session. Further documentation will be completed in Cardiac Science/[employer] System and will be scanned into the medical record upon discharge.     32

## 2024-10-03 NOTE — DIETITIAN INITIAL EVALUATION ADULT - NSFNSGIIOFT_GEN_A_CORE
10-02-24 @ 07:01  -  10-03-24 @ 07:00  --------------------------------------------------------  OUT:    Chest Tube (mL): 106 mL  Total OUT: 106 mL    Total NET: -106 mL      10-03-24 @ 07:01  -  10-03-24 @ 15:28  --------------------------------------------------------  OUT:    Chest Tube (mL): 50 mL  Total OUT: 50 mL    Total NET: -50 mL

## 2024-10-03 NOTE — DIETITIAN INITIAL EVALUATION ADULT - PERTINENT MEDS FT
MEDICATIONS  (STANDING):  acetaminophen     Tablet .. 975 milliGRAM(s) Oral every 6 hours  bisacodyl 5 milliGRAM(s) Oral at bedtime  cefTRIAXone   IVPB 2000 milliGRAM(s) IV Intermittent every 24 hours  chlorhexidine 4% Liquid 1 Application(s) Topical once  guaiFENesin Oral Liquid (Sugar-Free) 200 milliGRAM(s) Oral every 6 hours  heparin   Injectable 5000 Unit(s) SubCutaneous every 8 hours  lactated ringers. 1000 milliLiter(s) (50 mL/Hr) IV Continuous <Continuous>  lidocaine   4% Patch 1 Patch Transdermal daily  metoprolol tartrate 12.5 milliGRAM(s) Oral every 12 hours  pantoprazole    Tablet 40 milliGRAM(s) Oral before breakfast  senna 2 Tablet(s) Oral at bedtime    MEDICATIONS  (PRN):  ibuprofen  Tablet. 400 milliGRAM(s) Oral every 6 hours PRN Temp greater or equal to 38C (100.4F), Mild Pain (1 - 3)  ipratropium    for Nebulization 500 MICROGram(s) Nebulizer every 6 hours PRN Shortness of Breath and/or Wheezing  oxyCODONE    IR 5 milliGRAM(s) Oral every 6 hours PRN Moderate Pain (4 - 6)  oxyCODONE    IR 10 milliGRAM(s) Oral every 6 hours PRN Severe Pain (7 - 10)

## 2024-10-03 NOTE — PROGRESS NOTE ADULT - REASON FOR ADMISSION
Pneumothorax s/p chest tube

## 2024-10-03 NOTE — DIETITIAN INITIAL EVALUATION ADULT - OTHER CALCULATIONS
pounds, %%  Pt within % IBW thus actual body weight used for all calculations. Needs adjusted for age, post-op healing.

## 2024-10-03 NOTE — PROGRESS NOTE ADULT - SUBJECTIVE AND OBJECTIVE BOX
PULMONARY CONSULT SERVICE FOLLOW-UP NOTE    INTERVAL HPI:  Reviewed chart and overnight events; patient seen and examined at bedside. Patient status post surgery with complaint of pain up and walking today.    MEDICATIONS:  Pulmonary:  guaiFENesin Oral Liquid (Sugar-Free) 200 milliGRAM(s) Oral every 6 hours  ipratropium    for Nebulization 500 MICROGram(s) Nebulizer every 6 hours PRN    Antimicrobials:  cefTRIAXone   IVPB 2000 milliGRAM(s) IV Intermittent every 24 hours    Anticoagulants:  heparin   Injectable 5000 Unit(s) SubCutaneous every 8 hours    Cardiac:  metoprolol tartrate 12.5 milliGRAM(s) Oral every 12 hours      Allergies    No Known Allergies    Intolerances        Vital Signs Last 24 Hrs  T(C): 36.7 (03 Oct 2024 17:06), Max: 37 (03 Oct 2024 09:19)  T(F): 98 (03 Oct 2024 17:06), Max: 98.6 (03 Oct 2024 09:19)  HR: 92 (03 Oct 2024 12:57) (66 - 106)  BP: 130/70 (03 Oct 2024 12:57) (107/61 - 143/63)  BP(mean): 94 (03 Oct 2024 12:57) (78 - 94)  RR: 17 (03 Oct 2024 12:57) (11 - 18)  SpO2: 95% (03 Oct 2024 12:57) (90% - 97%)    Parameters below as of 03 Oct 2024 12:57  Patient On (Oxygen Delivery Method): room air        10-02 @ 07:01  -  10-03 @ 07:00  --------------------------------------------------------  IN: 100 mL / OUT: 106 mL / NET: -6 mL    10-03 @ 07:01  -  10-03 @ 19:54  --------------------------------------------------------  IN: 0 mL / OUT: 50 mL / NET: -50 mL          PHYSICAL EXAM:    HEENT: NC/AT; PERRL, anicteric sclera; MMM  Neck: supple  Cardiovascular: +S1/S2, RRR  Respiratory: CTA B/L; no W/R/R  Gastrointestinal: soft, NT/ND  Extremities: WWP; no edema, clubbing or cyanosis  Vascular: 2+ radial pulses B/L  Neurological: awake and alert; TUTTLE    LABS:      CBC Full  -  ( 02 Oct 2024 22:36 )  WBC Count : 14.74 K/uL  RBC Count : 4.87 M/uL  Hemoglobin : 14.0 g/dL  Hematocrit : 41.1 %  Platelet Count - Automated : 194 K/uL  Mean Cell Volume : 84.4 fl  Mean Cell Hemoglobin : 28.7 pg  Mean Cell Hemoglobin Concentration : 34.1 gm/dL  Auto Neutrophil # : 13.54 K/uL  Auto Lymphocyte # : 0.55 K/uL  Auto Monocyte # : 0.55 K/uL  Auto Eosinophil # : 0.01 K/uL  Auto Basophil # : 0.02 K/uL  Auto Neutrophil % : 91.9 %  Auto Lymphocyte % : 3.7 %  Auto Monocyte % : 3.7 %  Auto Eosinophil % : 0.1 %  Auto Basophil % : 0.1 %    10-02    139  |  102  |  15  ----------------------------<  144[H]  4.4   |  25  |  0.88    Ca    8.8      02 Oct 2024 22:36            Urinalysis Basic - ( 02 Oct 2024 22:36 )    Color: x / Appearance: x / SG: x / pH: x  Gluc: 144 mg/dL / Ketone: x  / Bili: x / Urobili: x   Blood: x / Protein: x / Nitrite: x   Leuk Esterase: x / RBC: x / WBC x   Sq Epi: x / Non Sq Epi: x / Bacteria: x                RADIOLOGY & ADDITIONAL STUDIES:

## 2024-10-04 ENCOUNTER — TRANSCRIPTION ENCOUNTER (OUTPATIENT)
Age: 24
End: 2024-10-04

## 2024-10-04 VITALS
RESPIRATION RATE: 12 BRPM | SYSTOLIC BLOOD PRESSURE: 121 MMHG | DIASTOLIC BLOOD PRESSURE: 59 MMHG | HEART RATE: 66 BPM | OXYGEN SATURATION: 94 %

## 2024-10-04 PROBLEM — Z00.00 ENCOUNTER FOR PREVENTIVE HEALTH EXAMINATION: Status: ACTIVE | Noted: 2024-10-04

## 2024-10-04 PROCEDURE — 85025 COMPLETE CBC W/AUTO DIFF WBC: CPT

## 2024-10-04 PROCEDURE — 87637 SARSCOV2&INF A&B&RSV AMP PRB: CPT

## 2024-10-04 PROCEDURE — S2900: CPT

## 2024-10-04 PROCEDURE — 84295 ASSAY OF SERUM SODIUM: CPT

## 2024-10-04 PROCEDURE — 80053 COMPREHEN METABOLIC PANEL: CPT

## 2024-10-04 PROCEDURE — 87205 SMEAR GRAM STAIN: CPT

## 2024-10-04 PROCEDURE — 82330 ASSAY OF CALCIUM: CPT

## 2024-10-04 PROCEDURE — 85610 PROTHROMBIN TIME: CPT

## 2024-10-04 PROCEDURE — 71046 X-RAY EXAM CHEST 2 VIEWS: CPT | Mod: 26

## 2024-10-04 PROCEDURE — 93010 ELECTROCARDIOGRAM REPORT: CPT

## 2024-10-04 PROCEDURE — 71045 X-RAY EXAM CHEST 1 VIEW: CPT

## 2024-10-04 PROCEDURE — 71046 X-RAY EXAM CHEST 2 VIEWS: CPT

## 2024-10-04 PROCEDURE — 71250 CT THORAX DX C-: CPT | Mod: MC

## 2024-10-04 PROCEDURE — 88305 TISSUE EXAM BY PATHOLOGIST: CPT

## 2024-10-04 PROCEDURE — 32551 INSERTION OF CHEST TUBE: CPT

## 2024-10-04 PROCEDURE — 84132 ASSAY OF SERUM POTASSIUM: CPT

## 2024-10-04 PROCEDURE — 71045 X-RAY EXAM CHEST 1 VIEW: CPT | Mod: 26,59,77

## 2024-10-04 PROCEDURE — 94640 AIRWAY INHALATION TREATMENT: CPT

## 2024-10-04 PROCEDURE — 86900 BLOOD TYPING SEROLOGIC ABO: CPT

## 2024-10-04 PROCEDURE — 93005 ELECTROCARDIOGRAM TRACING: CPT

## 2024-10-04 PROCEDURE — 83735 ASSAY OF MAGNESIUM: CPT

## 2024-10-04 PROCEDURE — 84100 ASSAY OF PHOSPHORUS: CPT

## 2024-10-04 PROCEDURE — 0241U: CPT

## 2024-10-04 PROCEDURE — 82803 BLOOD GASES ANY COMBINATION: CPT

## 2024-10-04 PROCEDURE — C1889: CPT

## 2024-10-04 PROCEDURE — 87070 CULTURE OTHR SPECIMN AEROBIC: CPT

## 2024-10-04 PROCEDURE — C1729: CPT

## 2024-10-04 PROCEDURE — 86901 BLOOD TYPING SEROLOGIC RH(D): CPT

## 2024-10-04 PROCEDURE — 80048 BASIC METABOLIC PNL TOTAL CA: CPT

## 2024-10-04 PROCEDURE — 36415 COLL VENOUS BLD VENIPUNCTURE: CPT

## 2024-10-04 PROCEDURE — 0225U NFCT DS DNA&RNA 21 SARSCOV2: CPT

## 2024-10-04 PROCEDURE — 99291 CRITICAL CARE FIRST HOUR: CPT | Mod: 25

## 2024-10-04 PROCEDURE — 88307 TISSUE EXAM BY PATHOLOGIST: CPT

## 2024-10-04 PROCEDURE — 87077 CULTURE AEROBIC IDENTIFY: CPT

## 2024-10-04 PROCEDURE — 86850 RBC ANTIBODY SCREEN: CPT

## 2024-10-04 PROCEDURE — C9399: CPT

## 2024-10-04 PROCEDURE — 71045 X-RAY EXAM CHEST 1 VIEW: CPT | Mod: 26,59

## 2024-10-04 RX ORDER — CEFPODOXIME PROXETIL 50 MG/5 ML
1 SUSPENSION, RECONSTITUTED, ORAL (ML) ORAL
Qty: 6 | Refills: 0
Start: 2024-10-04 | End: 2024-10-06

## 2024-10-04 RX ORDER — ACETAMINOPHEN 325 MG
3 TABLET ORAL
Qty: 360 | Refills: 0
Start: 2024-10-04 | End: 2024-11-02

## 2024-10-04 RX ORDER — OXYCODONE HYDROCHLORIDE 30 MG/1
1 TABLET, FILM COATED, EXTENDED RELEASE ORAL
Qty: 20 | Refills: 0
Start: 2024-10-04 | End: 2024-10-08

## 2024-10-04 RX ORDER — SENNOSIDES 8.6 MG
2 TABLET ORAL
Qty: 28 | Refills: 0
Start: 2024-10-04 | End: 2024-10-17

## 2024-10-04 RX ADMIN — Medication 975 MILLIGRAM(S): at 10:12

## 2024-10-04 RX ADMIN — Medication 975 MILLIGRAM(S): at 04:00

## 2024-10-04 RX ADMIN — Medication 975 MILLIGRAM(S): at 15:14

## 2024-10-04 RX ADMIN — Medication 5000 UNIT(S): at 03:41

## 2024-10-04 RX ADMIN — OXYCODONE HYDROCHLORIDE 10 MILLIGRAM(S): 30 TABLET, FILM COATED, EXTENDED RELEASE ORAL at 03:42

## 2024-10-04 RX ADMIN — Medication 975 MILLIGRAM(S): at 08:37

## 2024-10-04 RX ADMIN — Medication 5000 UNIT(S): at 17:23

## 2024-10-04 RX ADMIN — Medication 975 MILLIGRAM(S): at 15:24

## 2024-10-04 RX ADMIN — Medication 975 MILLIGRAM(S): at 03:41

## 2024-10-04 RX ADMIN — OXYCODONE HYDROCHLORIDE 10 MILLIGRAM(S): 30 TABLET, FILM COATED, EXTENDED RELEASE ORAL at 12:00

## 2024-10-04 RX ADMIN — Medication 5000 UNIT(S): at 10:09

## 2024-10-04 RX ADMIN — OXYCODONE HYDROCHLORIDE 10 MILLIGRAM(S): 30 TABLET, FILM COATED, EXTENDED RELEASE ORAL at 11:20

## 2024-10-04 RX ADMIN — Medication 12.5 MILLIGRAM(S): at 17:22

## 2024-10-04 RX ADMIN — GUAIFENESIN 200 MILLIGRAM(S): 100 SOLUTION ORAL at 03:40

## 2024-10-04 RX ADMIN — OXYCODONE HYDROCHLORIDE 10 MILLIGRAM(S): 30 TABLET, FILM COATED, EXTENDED RELEASE ORAL at 04:00

## 2024-10-04 RX ADMIN — GUAIFENESIN 200 MILLIGRAM(S): 100 SOLUTION ORAL at 08:37

## 2024-10-04 RX ADMIN — GUAIFENESIN 200 MILLIGRAM(S): 100 SOLUTION ORAL at 15:13

## 2024-10-04 RX ADMIN — LIDOCAINE 1 PATCH: 50 CREAM TOPICAL at 11:05

## 2024-10-04 RX ADMIN — PANTOPRAZOLE SODIUM 40 MILLIGRAM(S): 40 TABLET, DELAYED RELEASE ORAL at 07:05

## 2024-10-04 NOTE — DISCHARGE NOTE NURSING/CASE MANAGEMENT/SOCIAL WORK - PATIENT PORTAL LINK FT
You can access the FollowMyHealth Patient Portal offered by [facility] by registering at the following website: [email or web address]. By joining [facility]’s FollowMyHealth portal, you will also be able to view your health information using other applications (apps) compatible with our system.

## 2024-10-04 NOTE — DISCHARGE NOTE NURSING/CASE MANAGEMENT/SOCIAL WORK - NSDCPEFALRISK_GEN_ALL_CORE
For information on Fall & Injury Prevention, visit: [email or web address] OR  [email or web address] OR  https://www.cdc.gov/steadi/patient.html

## 2024-10-04 NOTE — DISCHARGE NOTE NURSING/CASE MANAGEMENT/SOCIAL WORK - NSDCFUADDAPPT_GEN_ALL_CORE_FT
Please call the office with any questions or concerns at [phone]    Please bring your Insurance card, Photo ID and Discharge paperwork to the following appointment:    (1) Please follow up with your Pulmonary Medicine Provider, Dr. Mavis Will at [address] [place] [address], [address] [place] [address] on 10/11/2024 at 11:30am.    Appointment was scheduled by Ms. ROSA Gomez, Referral Coordinator.

## 2024-10-09 LAB — SURGICAL PATHOLOGY STUDY: SIGNIFICANT CHANGE UP

## 2024-10-14 ENCOUNTER — APPOINTMENT (OUTPATIENT)
Dept: PULMONOLOGY | Facility: CLINIC | Age: 24
End: 2024-10-14
Payer: COMMERCIAL

## 2024-10-14 ENCOUNTER — NON-APPOINTMENT (OUTPATIENT)
Age: 24
End: 2024-10-14

## 2024-10-14 VITALS
TEMPERATURE: 97.3 F | OXYGEN SATURATION: 97 % | HEIGHT: 74 IN | DIASTOLIC BLOOD PRESSURE: 89 MMHG | HEART RATE: 94 BPM | BODY MASS INDEX: 27.85 KG/M2 | SYSTOLIC BLOOD PRESSURE: 128 MMHG | WEIGHT: 217 LBS

## 2024-10-14 DIAGNOSIS — Z23 ENCOUNTER FOR IMMUNIZATION: ICD-10-CM

## 2024-10-14 DIAGNOSIS — Z09 ENCOUNTER FOR FOLLOW-UP EXAMINATION AFTER COMPLETED TREATMENT FOR CONDITIONS OTHER THAN MALIGNANT NEOPLASM: ICD-10-CM

## 2024-10-14 DIAGNOSIS — J45.909 UNSPECIFIED ASTHMA, UNCOMPLICATED: ICD-10-CM

## 2024-10-14 PROCEDURE — 90662 IIV NO PRSV INCREASED AG IM: CPT

## 2024-10-14 PROCEDURE — G0008: CPT

## 2024-10-14 PROCEDURE — 99215 OFFICE O/P EST HI 40 MIN: CPT | Mod: 25

## 2024-10-14 RX ORDER — IBUPROFEN 200 MG/1
200 CAPSULE, LIQUID FILLED ORAL
Refills: 0 | Status: ACTIVE | COMMUNITY

## 2024-10-14 RX ORDER — OXYCODONE HYDROCHLORIDE 5 MG/1
5 CAPSULE ORAL
Refills: 0 | Status: ACTIVE | COMMUNITY

## 2024-10-14 RX ORDER — CEFPODOXIME PROXETIL 200 MG/1
200 TABLET, FILM COATED ORAL TWICE DAILY
Refills: 0 | Status: ACTIVE | COMMUNITY

## 2024-10-17 ENCOUNTER — OUTPATIENT (OUTPATIENT)
Dept: OUTPATIENT SERVICES | Facility: HOSPITAL | Age: 24
LOS: 1 days | End: 2024-10-17
Payer: COMMERCIAL

## 2024-10-17 ENCOUNTER — APPOINTMENT (OUTPATIENT)
Dept: THORACIC SURGERY | Facility: CLINIC | Age: 24
End: 2024-10-17
Payer: COMMERCIAL

## 2024-10-17 VITALS
BODY MASS INDEX: 27.72 KG/M2 | HEART RATE: 66 BPM | OXYGEN SATURATION: 98 % | HEIGHT: 74 IN | SYSTOLIC BLOOD PRESSURE: 118 MMHG | WEIGHT: 216 LBS | TEMPERATURE: 96.7 F | DIASTOLIC BLOOD PRESSURE: 68 MMHG

## 2024-10-17 DIAGNOSIS — J93.9 PNEUMOTHORAX, UNSPECIFIED: ICD-10-CM

## 2024-10-17 PROCEDURE — 71046 X-RAY EXAM CHEST 2 VIEWS: CPT | Mod: 26

## 2024-10-17 PROCEDURE — 99024 POSTOP FOLLOW-UP VISIT: CPT

## 2024-10-17 PROCEDURE — 71046 X-RAY EXAM CHEST 2 VIEWS: CPT

## 2024-11-20 ENCOUNTER — TRANSCRIPTION ENCOUNTER (OUTPATIENT)
Age: 24
End: 2024-11-20

## 2024-11-20 ENCOUNTER — INPATIENT (INPATIENT)
Facility: HOSPITAL | Age: 24
LOS: 0 days | Discharge: HOME | End: 2024-11-21
Attending: UROLOGY | Admitting: UROLOGY
Payer: COMMERCIAL

## 2024-11-20 VITALS
HEART RATE: 64 BPM | RESPIRATION RATE: 18 BRPM | TEMPERATURE: 98 F | HEIGHT: 74 IN | WEIGHT: 210.1 LBS | DIASTOLIC BLOOD PRESSURE: 58 MMHG | SYSTOLIC BLOOD PRESSURE: 113 MMHG | OXYGEN SATURATION: 100 %

## 2024-11-20 DIAGNOSIS — N44.00 TORSION OF TESTIS, UNSPECIFIED: ICD-10-CM

## 2024-11-20 LAB
ALBUMIN SERPL ELPH-MCNC: 4.9 G/DL — SIGNIFICANT CHANGE UP (ref 3.3–5)
ALP SERPL-CCNC: 54 U/L — SIGNIFICANT CHANGE UP (ref 40–120)
ALT FLD-CCNC: 34 U/L — SIGNIFICANT CHANGE UP (ref 10–45)
ANION GAP SERPL CALC-SCNC: 14 MMOL/L — SIGNIFICANT CHANGE UP (ref 5–17)
APPEARANCE UR: CLEAR — SIGNIFICANT CHANGE UP
APTT BLD: 29.7 SEC — SIGNIFICANT CHANGE UP (ref 24.5–35.6)
AST SERPL-CCNC: 32 U/L — SIGNIFICANT CHANGE UP (ref 10–40)
BASOPHILS # BLD AUTO: 0.03 K/UL — SIGNIFICANT CHANGE UP (ref 0–0.2)
BASOPHILS NFR BLD AUTO: 0.2 % — SIGNIFICANT CHANGE UP (ref 0–2)
BILIRUB SERPL-MCNC: 1.2 MG/DL — SIGNIFICANT CHANGE UP (ref 0.2–1.2)
BILIRUB UR-MCNC: NEGATIVE — SIGNIFICANT CHANGE UP
BLD GP AB SCN SERPL QL: NEGATIVE — SIGNIFICANT CHANGE UP
BUN SERPL-MCNC: 16 MG/DL — SIGNIFICANT CHANGE UP (ref 7–23)
CALCIUM SERPL-MCNC: 9.9 MG/DL — SIGNIFICANT CHANGE UP (ref 8.4–10.5)
CHLORIDE SERPL-SCNC: 97 MMOL/L — SIGNIFICANT CHANGE UP (ref 96–108)
CO2 SERPL-SCNC: 22 MMOL/L — SIGNIFICANT CHANGE UP (ref 22–31)
COLOR SPEC: YELLOW — SIGNIFICANT CHANGE UP
CREAT SERPL-MCNC: 0.93 MG/DL — SIGNIFICANT CHANGE UP (ref 0.5–1.3)
DIFF PNL FLD: NEGATIVE — SIGNIFICANT CHANGE UP
EGFR: 118 ML/MIN/1.73M2 — SIGNIFICANT CHANGE UP
EOSINOPHIL # BLD AUTO: 0.05 K/UL — SIGNIFICANT CHANGE UP (ref 0–0.5)
EOSINOPHIL NFR BLD AUTO: 0.4 % — SIGNIFICANT CHANGE UP (ref 0–6)
GLUCOSE SERPL-MCNC: 110 MG/DL — HIGH (ref 70–99)
GLUCOSE UR QL: NEGATIVE MG/DL — SIGNIFICANT CHANGE UP
HCT VFR BLD CALC: 41.6 % — SIGNIFICANT CHANGE UP (ref 39–50)
HCV AB S/CO SERPL IA: 0.05 S/CO — SIGNIFICANT CHANGE UP
HCV AB SERPL-IMP: SIGNIFICANT CHANGE UP
HGB BLD-MCNC: 14.3 G/DL — SIGNIFICANT CHANGE UP (ref 13–17)
IMM GRANULOCYTES NFR BLD AUTO: 0.4 % — SIGNIFICANT CHANGE UP (ref 0–0.9)
INR BLD: 0.91 — SIGNIFICANT CHANGE UP (ref 0.85–1.16)
KETONES UR-MCNC: ABNORMAL MG/DL
LEUKOCYTE ESTERASE UR-ACNC: NEGATIVE — SIGNIFICANT CHANGE UP
LIDOCAIN IGE QN: 27 U/L — SIGNIFICANT CHANGE UP (ref 7–60)
LYMPHOCYTES # BLD AUTO: 1.18 K/UL — SIGNIFICANT CHANGE UP (ref 1–3.3)
LYMPHOCYTES # BLD AUTO: 9.7 % — LOW (ref 13–44)
MCHC RBC-ENTMCNC: 28.7 PG — SIGNIFICANT CHANGE UP (ref 27–34)
MCHC RBC-ENTMCNC: 34.4 G/DL — SIGNIFICANT CHANGE UP (ref 32–36)
MCV RBC AUTO: 83.4 FL — SIGNIFICANT CHANGE UP (ref 80–100)
MONOCYTES # BLD AUTO: 0.53 K/UL — SIGNIFICANT CHANGE UP (ref 0–0.9)
MONOCYTES NFR BLD AUTO: 4.4 % — SIGNIFICANT CHANGE UP (ref 2–14)
NEUTROPHILS # BLD AUTO: 10.31 K/UL — HIGH (ref 1.8–7.4)
NEUTROPHILS NFR BLD AUTO: 84.9 % — HIGH (ref 43–77)
NITRITE UR-MCNC: NEGATIVE — SIGNIFICANT CHANGE UP
NRBC # BLD: 0 /100 WBCS — SIGNIFICANT CHANGE UP (ref 0–0)
PH UR: 6.5 — SIGNIFICANT CHANGE UP (ref 5–8)
PLATELET # BLD AUTO: 186 K/UL — SIGNIFICANT CHANGE UP (ref 150–400)
POTASSIUM SERPL-MCNC: 3.7 MMOL/L — SIGNIFICANT CHANGE UP (ref 3.5–5.3)
POTASSIUM SERPL-SCNC: 3.7 MMOL/L — SIGNIFICANT CHANGE UP (ref 3.5–5.3)
PROT SERPL-MCNC: 7.8 G/DL — SIGNIFICANT CHANGE UP (ref 6–8.3)
PROT UR-MCNC: NEGATIVE MG/DL — SIGNIFICANT CHANGE UP
PROTHROM AB SERPL-ACNC: 10.6 SEC — SIGNIFICANT CHANGE UP (ref 9.9–13.4)
RBC # BLD: 4.99 M/UL — SIGNIFICANT CHANGE UP (ref 4.2–5.8)
RBC # FLD: 12.5 % — SIGNIFICANT CHANGE UP (ref 10.3–14.5)
RH IG SCN BLD-IMP: POSITIVE — SIGNIFICANT CHANGE UP
SODIUM SERPL-SCNC: 133 MMOL/L — LOW (ref 135–145)
SP GR SPEC: <1.005 — LOW (ref 1–1.03)
UROBILINOGEN FLD QL: 0.2 MG/DL — SIGNIFICANT CHANGE UP (ref 0.2–1)
WBC # BLD: 12.15 K/UL — HIGH (ref 3.8–10.5)
WBC # FLD AUTO: 12.15 K/UL — HIGH (ref 3.8–10.5)

## 2024-11-20 PROCEDURE — 76870 US EXAM SCROTUM: CPT | Mod: 26

## 2024-11-20 PROCEDURE — 54600 REDUCE TESTIS TORSION: CPT | Mod: RT

## 2024-11-20 PROCEDURE — 99285 EMERGENCY DEPT VISIT HI MDM: CPT

## 2024-11-20 RX ORDER — OXYCODONE HYDROCHLORIDE 30 MG/1
1 TABLET ORAL
Qty: 12 | Refills: 0
Start: 2024-11-20 | End: 2024-11-22

## 2024-11-20 RX ORDER — SODIUM CHLORIDE 9 MG/ML
1000 INJECTION, SOLUTION INTRAMUSCULAR; INTRAVENOUS; SUBCUTANEOUS ONCE
Refills: 0 | Status: COMPLETED | OUTPATIENT
Start: 2024-11-20 | End: 2024-11-20

## 2024-11-20 RX ORDER — DOCUSATE SODIUM 100 MG
1 CAPSULE ORAL
Qty: 10 | Refills: 0
Start: 2024-11-20 | End: 2024-11-24

## 2024-11-20 RX ORDER — OXYCODONE HYDROCHLORIDE 30 MG/1
5 TABLET ORAL EVERY 6 HOURS
Refills: 0 | Status: DISCONTINUED | OUTPATIENT
Start: 2024-11-20 | End: 2024-11-21

## 2024-11-20 RX ORDER — HYDROMORPHONE HYDROCHLORIDE 2 MG/1
0.2 TABLET ORAL
Refills: 0 | Status: DISCONTINUED | OUTPATIENT
Start: 2024-11-20 | End: 2024-11-21

## 2024-11-20 RX ORDER — BENZOCAINE, MENTHOL 15; 3.6 MG/1; MG/1
1 LOZENGE ORAL ONCE
Refills: 0 | Status: COMPLETED | OUTPATIENT
Start: 2024-11-20 | End: 2024-11-20

## 2024-11-20 RX ORDER — ACETAMINOPHEN 500MG 500 MG/1
650 TABLET, COATED ORAL EVERY 6 HOURS
Refills: 0 | Status: DISCONTINUED | OUTPATIENT
Start: 2024-11-20 | End: 2024-11-21

## 2024-11-20 RX ADMIN — OXYCODONE HYDROCHLORIDE 5 MILLIGRAM(S): 30 TABLET ORAL at 23:28

## 2024-11-20 RX ADMIN — OXYCODONE HYDROCHLORIDE 5 MILLIGRAM(S): 30 TABLET ORAL at 22:28

## 2024-11-20 RX ADMIN — SODIUM CHLORIDE 1000 MILLILITER(S): 9 INJECTION, SOLUTION INTRAMUSCULAR; INTRAVENOUS; SUBCUTANEOUS at 13:16

## 2024-11-20 RX ADMIN — Medication 4 MILLIGRAM(S): at 13:16

## 2024-11-20 RX ADMIN — BENZOCAINE, MENTHOL 1 LOZENGE: 15; 3.6 LOZENGE ORAL at 20:59

## 2024-11-20 RX ADMIN — SODIUM CHLORIDE 1000 MILLILITER(S): 9 INJECTION, SOLUTION INTRAMUSCULAR; INTRAVENOUS; SUBCUTANEOUS at 16:11

## 2024-11-20 NOTE — ED PROVIDER NOTE - CLINICAL SUMMARY MEDICAL DECISION MAKING FREE TEXT BOX
24 year old male with history of childhood asthma, R PTX s/p pleurodesis 1 mo ago here, presenting with sudden onset acute R testicular pain with exam concerning for testicular torsion. Attempted right law manual detorsion with immediate improvement in his pain--unclear if now resolved--will still pursue emergent testicular US study, urology team consulted for eval. Abdomen is soft/NT--no concern for intraabdominal process. 24 year old male with history of childhood asthma, R PTX s/p pleurodesis 1 mo ago here, presenting with sudden onset acute R testicular pain with exam concerning for testicular torsion. Attempted rightward manual detorsion with immediate improvement in his pain--unclear if now resolved--will still pursue emergent testicular US study, urology team consulted for eval. Abdomen is soft/NT--no concern for intraabdominal process.

## 2024-11-20 NOTE — PROGRESS NOTE ADULT - SUBJECTIVE AND OBJECTIVE BOX
UROLOGY POST OP NOTE (PAGER # [phone])    PROCEDURE: bilat orchiopexy    T(C): 37.4 (11-20-24 @ 21:23), Max: 37.4 (11-20-24 @ 21:23)  HR: 76 (11-20-24 @ 21:23) (60 - 77)  BP: 132/71 (11-20-24 @ 21:23) (99/63 - 132/71)  RR: 18 (11-20-24 @ 21:23) (13 - 20)  SpO2: 97% (11-20-24 @ 21:23) (95% - 100%)  Wt(kg): --    Subjective: pain controlled. Denies CP, SOB, N, V   ON PE: awake and alert    Abdomen: nontender, nondistended    : no suprapubic/CVAT, dressing dry and intact                          14.3   12.15 )-----------( 186      ( 20 Nov 2024 13:09 )             41.6     11-20    133[L]  |  97  |  16  ----------------------------<  110[H]  3.7   |  22  |  0.93    Ca    9.9      20 Nov 2024 13:09    TPro  7.8  /  Alb  4.9  /  TBili  1.2  /  DBili  x   /  AST  32  /  ALT  34  /  AlkPhos  54  11-20      A/P:

## 2024-11-20 NOTE — ED PROVIDER NOTE - PHYSICAL EXAMINATION
Gen - NAD; well-appearing; A+Ox3   HEENT - NCAT, EOMI, moist mucous membranes, clear oropharynx  Neck - supple  Resp - CTAB, no increased WOB  CV -  RRR, no m/r/g  Abd - soft, NT, ND; no guarding or rebound  MSK - FROM of b/l UE and LE, no gross deformities  Extrem - no LE edema  Neuro - no focal motor or sensation deficits  Skin - warm, well perfused   (emergent exam):  +swelling and tenseness to R testicle, tenderness to touch; L testicle normal, soft, nontender

## 2024-11-20 NOTE — H&P ADULT - HISTORY OF PRESENT ILLNESS
24 year old male with history of childhood asthma, R PTX s/p pleurodesis 1 mo ago here, presenting with acute R testicular pain. States having onset about 2 hrs ago. Worked out this morning, then went to work, spontaneous had sudden onset of R sided testicular/groin pain, denies vomiting. Denies any trauma. 24 year old male with history of childhood asthma, R PTX s/p pleurodesis 1 mo ago here, presenting with acute R testicular pain. States having onset about 2 hrs ago. Worked out this morning, then went to work, spontaneous had sudden onset of R sided testicular/groin pain, denies vomiting. Denies any trauma.     Note: Above as discussed. Patient reports to lateral R testicular pain sudden in onset post weight training exercise. No previous instances of similar pain. Pain alleviated upon manual manipulation by Dr Lozada in ED. Patient denies any fever, chills, dysuria, hematuria. Reports that the pain radiated towards groin and low R sided back. Severe in nature at time of onset.

## 2024-11-20 NOTE — H&P ADULT - NSHPLABSRESULTS_GEN_ALL_CORE
VITALS:    T(F): 98.2 (11-20-24 @ 16:11), Max: 98.2 (11-20-24 @ 16:09)  HR: 72 (11-20-24 @ 16:11) (64 - 72)  BP: 120/81 (11-20-24 @ 16:11) (99/63 - 120/81)  RR: 18 (11-20-24 @ 16:11) (18 - 20)  SpO2: 100% (11-20-24 @ 16:11) (100% - 100%)  Wt(kg): --    LABS:                        14.3   12.15 )-----------( 186      ( 20 Nov 2024 13:09 )             41.6     11-20    133[L]  |  97  |  16  ----------------------------<  110[H]  3.7   |  22  |  0.93    Ca    9.9      20 Nov 2024 13:09    TPro  7.8  /  Alb  4.9  /  TBili  1.2  /  DBili  x   /  AST  32  /  ALT  34  /  AlkPhos  54  11-20    PT/INR - ( 20 Nov 2024 13:19 )   PT: 10.6 sec;   INR: 0.91          PTT - ( 20 Nov 2024 13:19 )  PTT:29.7 sec  Urinalysis Basic - ( 20 Nov 2024 16:42 )    Color: Yellow / Appearance: Clear / SG: <1.005 / pH: x  Gluc: x / Ketone: Trace mg/dL  / Bili: Negative / Urobili: 0.2 mg/dL   Blood: x / Protein: Negative mg/dL / Nitrite: Negative   Leuk Esterase: Negative / RBC: x / WBC x   Sq Epi: x / Non Sq Epi: x / Bacteria: x        RADIOLOGY & ADDITIONAL TESTS:    ACC: [id number] EXAM:  US SCROTUM AND CONTENTS   ORDERED BY: JULIETTE MONTOYA     PROCEDURE DATE:  11/20/2024          INTERPRETATION:  CLINICAL INFORMATION: Right testicular pain. Rule out   right testicular torsion. Status post manual distortion in the Emergency   Department.    COMPARISON: None available.    TECHNIQUE: Testicular ultrasound utilizing color and spectral Doppler.    FINDINGS:    RIGHT:  Right testis: 5.1 cm x 2.2 cm x 3.3 cm. Normal echogenicity and   echotexture with no masses or areas of architectural distortion.   Asymmetrically decreased arterial and venous blood flow pattern.  Right epididymis: Within normal limits.  Right hydrocele: Small  Right varicocele: None.  There is twisting of the right spermatic cord.    LEFT:  Left testis: 4.4 cm x 2.2 cm x 3.2 cm. Normal echogenicity and   echotexture with no masses or areas of architectural distortion. Normal   arterial and venous blood flow pattern.  Left epididymis: 0.2 x 0.2 x 0.1 cm cyst or spermatocele in the   epididymal head.  Left hydrocele: Small  Left varicocele: None.      IMPRESSION:    There is twisting of the right spermatic cord with asymmetrically   decreased arterial and venous blood flow to the right testicle,   consistent with persistent torsion, which is likely incomplete given the   presence of both arterial and venous blood flow in the right testicle.

## 2024-11-20 NOTE — ED PROVIDER NOTE - PROGRESS NOTE DETAILS
Kevin Lozada MD: US showing twisting of R spermatic cord with decreased flow to R testicle--admit to urology for OR intervention.

## 2024-11-20 NOTE — ED ADULT NURSE NOTE - OBJECTIVE STATEMENT
24y M presents to ED c/o R testicular pain/swelling [id number] PTA. Endorses sudden onset pain to R testicle while at rest 2 hours PTA, a/w swelling. Denies N/V, urinary sx, trauma/injury. Pt AAOx4, speaking in full and clear sentences. Upg to MD Neville Gonzalez at bedside, PIV placed.

## 2024-11-20 NOTE — ED ADULT NURSE NOTE - NS ED NURSE LEVEL OF CONSCIOUSNESS SPEECH
Speaking Coherently CBC (12-27 @ 00:08)                              16.1                           14.58<H>  )----------------(  307        69.8  % Neutrophils, 8.0<L>% Lymphocytes, ANC: 11.34<H>                              53.0<H>                BMP (12-27 @ 00:08)             135     |  97<L>   |  26<H> 		Ca++ --      Ca 10.7<H>             ---------------------------------( 372<H>		Mg 2.10               TNP     |  15<L>   |  1.14  			Ph --        LFTs (12-27 @ 00:08)      TPro TNP / Alb 4.7 / TBili 0.9 / DBili -- / <H> / ALT <5 / AlkPhos 47      ABG (12-27 @ 00:08)      /  /  /  /  / %     Lactate:   7.2<HH>    VBG (12-27 @ 00:08)     7.31<L> / 46 / 28 / 23 / -3.4<L> / 29.5<L>%    CT AP:  LOWER CHEST: Within normal limits.    LIVER: Steatosis.  BILE DUCTS: Normal caliber.  GALLBLADDER: Cholelithiasis.  SPLEEN: Within normal limits.  PANCREAS: Within normal limits.  ADRENALS: Within normal limits.  KIDNEYS/URETERS: Within normal limits.    BLADDER: Within normal limits.  REPRODUCTIVE ORGANS: Prostate within normal limits. Calcifications of the   vas deferens is noted.    BOWEL: Dilated fluid-filled small bowel loops are appreciated proximally,   measuring up to 4.5 cm in diameter. There is a subtle transition of   caliber seen within the right anterior lower abdomen just lateral to a   midline ventral hernia. Distal to this point small bowel loops are   relatively decompressed. Multiple bowel containing ventral hernias are   appreciated, involving partial loop of the transverse colon and multiple   small bowel loops. There is stool throughout the colon. There are   occasional colonic diverticula. No acute diverticulitis.  PERITONEUM: No ascites. No free air.  VESSELS: Within normal limits.  RETROPERITONEUM/LYMPH NODES: No pathologically enlarged lymph nodes.  ABDOMINAL WALL: Postsurgical changes.  BONES: Degenerative changes.    IMPRESSION:  Dilated proximal small bowel loops, with distal small bowel loops   appearing relatively decompressed. There is a subtle transition of   caliber seen along the right anterior lower abdomen, just lateral to a   midline ventral hernia. Findings are suspicious for early or partial   small bowel obstruction, likely related to adhesions.    Multiple bowel containing ventral hernias.    Steatosis.    Cholelithiasis.

## 2024-11-20 NOTE — PACU DISCHARGE NOTE - NAUSEA/VOMITING:
None errors which are inherent in voice recognition technology.** Electronically signed by Dr. Hieu Khan        EKG: Sinus rhythm with PACs.  Left posterior fascicular block.  T wave inversions in anterolateral leads and inferior leads.    Electronically signed by Ashita Behl, MD on 9/27/2024 at 12:59 PM

## 2024-11-20 NOTE — ED PROVIDER NOTE - OBJECTIVE STATEMENT
24 year old male with history of childhood asthma, R PTX s/p pleurodesis 1 mo ago here, presenting with acute R testicular pain. States having onset about 2 hrs ago. Worked out this morning, then went to work, spontaneous had sudden onset of R sided testicular/groin pain, denies vomiting. Denies any trauma.

## 2024-11-20 NOTE — H&P ADULT - NSHPPHYSICALEXAM_GEN_ALL_CORE
GEN: NAD  ABD: soft, nt, nd  CARD: RRR  PULM: clear  : scrotum without erythema or swelling, R testicle soft nontender, slight horizontal lie appreciated, spermatic cord and epididymus without tenderness to palpation

## 2024-11-21 ENCOUNTER — APPOINTMENT (OUTPATIENT)
Dept: THORACIC SURGERY | Facility: CLINIC | Age: 24
End: 2024-11-21
Payer: COMMERCIAL

## 2024-11-21 ENCOUNTER — TRANSCRIPTION ENCOUNTER (OUTPATIENT)
Age: 24
End: 2024-11-21

## 2024-11-21 ENCOUNTER — OUTPATIENT (OUTPATIENT)
Dept: OUTPATIENT SERVICES | Facility: HOSPITAL | Age: 24
LOS: 1 days | End: 2024-11-21
Payer: COMMERCIAL

## 2024-11-21 VITALS
HEIGHT: 74 IN | OXYGEN SATURATION: 99 % | SYSTOLIC BLOOD PRESSURE: 123 MMHG | DIASTOLIC BLOOD PRESSURE: 64 MMHG | WEIGHT: 210 LBS | BODY MASS INDEX: 26.95 KG/M2 | HEART RATE: 96 BPM | TEMPERATURE: 97.5 F

## 2024-11-21 VITALS
DIASTOLIC BLOOD PRESSURE: 65 MMHG | HEART RATE: 65 BPM | RESPIRATION RATE: 18 BRPM | OXYGEN SATURATION: 100 % | TEMPERATURE: 98 F | SYSTOLIC BLOOD PRESSURE: 125 MMHG

## 2024-11-21 DIAGNOSIS — Z09 ENCOUNTER FOR FOLLOW-UP EXAMINATION AFTER COMPLETED TREATMENT FOR CONDITIONS OTHER THAN MALIGNANT NEOPLASM: ICD-10-CM

## 2024-11-21 DIAGNOSIS — J93.9 PNEUMOTHORAX, UNSPECIFIED: ICD-10-CM

## 2024-11-21 PROCEDURE — 36415 COLL VENOUS BLD VENIPUNCTURE: CPT

## 2024-11-21 PROCEDURE — 86900 BLOOD TYPING SEROLOGIC ABO: CPT

## 2024-11-21 PROCEDURE — 99285 EMERGENCY DEPT VISIT HI MDM: CPT

## 2024-11-21 PROCEDURE — 96374 THER/PROPH/DIAG INJ IV PUSH: CPT

## 2024-11-21 PROCEDURE — 71046 X-RAY EXAM CHEST 2 VIEWS: CPT

## 2024-11-21 PROCEDURE — 86901 BLOOD TYPING SEROLOGIC RH(D): CPT

## 2024-11-21 PROCEDURE — 85025 COMPLETE CBC W/AUTO DIFF WBC: CPT

## 2024-11-21 PROCEDURE — 71046 X-RAY EXAM CHEST 2 VIEWS: CPT | Mod: 26

## 2024-11-21 PROCEDURE — C9399: CPT

## 2024-11-21 PROCEDURE — 86850 RBC ANTIBODY SCREEN: CPT

## 2024-11-21 PROCEDURE — 80053 COMPREHEN METABOLIC PANEL: CPT

## 2024-11-21 PROCEDURE — 85730 THROMBOPLASTIN TIME PARTIAL: CPT

## 2024-11-21 PROCEDURE — 83690 ASSAY OF LIPASE: CPT

## 2024-11-21 PROCEDURE — 86803 HEPATITIS C AB TEST: CPT

## 2024-11-21 PROCEDURE — 99024 POSTOP FOLLOW-UP VISIT: CPT

## 2024-11-21 PROCEDURE — 81003 URINALYSIS AUTO W/O SCOPE: CPT

## 2024-11-21 PROCEDURE — 76870 US EXAM SCROTUM: CPT

## 2024-11-21 PROCEDURE — 85610 PROTHROMBIN TIME: CPT

## 2024-11-21 RX ORDER — IBUPROFEN 200 MG
400 TABLET ORAL EVERY 6 HOURS
Refills: 0 | Status: DISCONTINUED | OUTPATIENT
Start: 2024-11-21 | End: 2024-11-21

## 2024-11-21 RX ORDER — OXYCODONE HYDROCHLORIDE 30 MG/1
1 TABLET ORAL
Qty: 0 | Refills: 0 | DISCHARGE
Start: 2024-11-21

## 2024-11-21 RX ORDER — OXYCODONE AND ACETAMINOPHEN 5; 325 MG/1; MG/1
1 TABLET ORAL
Qty: 4 | Refills: 0
Start: 2024-11-21

## 2024-11-21 RX ORDER — BENZOCAINE, MENTHOL 15; 3.6 MG/1; MG/1
1 LOZENGE ORAL THREE TIMES A DAY
Refills: 0 | Status: DISCONTINUED | OUTPATIENT
Start: 2024-11-21 | End: 2024-11-21

## 2024-11-21 RX ADMIN — Medication 400 MILLIGRAM(S): at 11:21

## 2024-11-21 RX ADMIN — OXYCODONE HYDROCHLORIDE 5 MILLIGRAM(S): 30 TABLET ORAL at 08:50

## 2024-11-21 RX ADMIN — OXYCODONE HYDROCHLORIDE 5 MILLIGRAM(S): 30 TABLET ORAL at 09:50

## 2024-11-21 RX ADMIN — ACETAMINOPHEN 500MG 650 MILLIGRAM(S): 500 TABLET, COATED ORAL at 10:28

## 2024-11-21 RX ADMIN — BENZOCAINE, MENTHOL 1 LOZENGE: 15; 3.6 LOZENGE ORAL at 11:29

## 2024-11-21 RX ADMIN — ACETAMINOPHEN 500MG 650 MILLIGRAM(S): 500 TABLET, COATED ORAL at 11:15

## 2024-11-21 RX ADMIN — Medication 400 MILLIGRAM(S): at 12:21

## 2024-11-21 NOTE — PROGRESS NOTE ADULT - SUBJECTIVE AND OBJECTIVE BOX
AM Note    No acute events overnight.      Vital Signs Last 24 Hrs  T(C): 36.4 (11-21-24 @ 00:35), Max: 37.4 (11-20-24 @ 21:23)  T(F): 97.5 (11-21-24 @ 00:35), Max: 99.3 (11-20-24 @ 21:23)  HR: 79 (11-21-24 @ 00:35) (60 - 79)  BP: 135/83 (11-21-24 @ 00:35) (99/63 - 135/83)  BP(mean): 84 (11-20-24 @ 20:28) (73 - 87)  RR: 18 (11-21-24 @ 00:35) (13 - 20)  SpO2: 98% (11-21-24 @ 00:35) (95% - 100%)     20 Nov 2024 13:09    133    |  97     |  16     ----------------------------<  110    3.7     |  22     |  0.93     Ca    9.9        20 Nov 2024 13:09    TPro  7.8    /  Alb  4.9    /  TBili  1.2    /  DBili  x      /  AST  32     /  ALT  34     /  AlkPhos  54     20 Nov 2024 13:09                          14.3   12.15 )-----------( 186      ( 20 Nov 2024 13:09 )             41.6         I&O's Summary    20 Nov 2024 07:01  -  21 Nov 2024 05:21  --------------------------------------------------------  IN: 480 mL / OUT: 1300 mL / NET: -820 mL        ON PE: awake and alert    Abdomen: nontender, nondistended    : no suprapubic/CVAT, dressing dry and intact

## 2024-11-21 NOTE — DISCHARGE NOTE PROVIDER - NSDCFUSCHEDAPPT_GEN_ALL_CORE_FT
Jese Dowd  [facility]ician [facility]  [address] [facility] [address]  Scheduled Appointment: 11/21/2024    John White  [facility]  [address] [facility] [address]  Scheduled Appointment: 12/09/2024     John White  [facility]  [address]  Scheduled Appointment: 12/09/2024

## 2024-11-21 NOTE — DISCHARGE NOTE NURSING/CASE MANAGEMENT/SOCIAL WORK - NSDCFUADDAPPT_GEN_ALL_CORE_FT
please follow up at the  clinic in 2 weeks.   address: [address] [place] [address], [address]     please call [phone] to schedule appointment

## 2024-11-21 NOTE — DISCHARGE NOTE PROVIDER - NSDCFUADDAPPT_GEN_ALL_CORE_FT
please follow up at the  clinic in 2 weeks.   address: [address] [place] [address], [address]     please call [phone] to schedule appointment

## 2024-11-21 NOTE — DISCHARGE NOTE PROVIDER - NSDCMRMEDTOKEN_GEN_ALL_CORE_FT
Colace 100 mg oral capsule: 1 cap(s) orally every 12 hours  oxyCODONE 5 mg oral tablet: 1 tab(s) orally every 6 hours As needed Moderate Pain (4 - 6)   Colace 100 mg oral capsule: 1 cap(s) orally every 12 hours  Percocet 5 mg-325 mg oral tablet: 1 tab(s) orally every 8 hours as needed for  severe pain MDD: 3 tabs

## 2024-11-21 NOTE — DISCHARGE NOTE NURSING/CASE MANAGEMENT/SOCIAL WORK - FINANCIAL ASSISTANCE
[facility] provides services at a reduced cost to those who are determined to be eligible through [facility]’s financial assistance program. Information regarding [facility]’s financial assistance program can be found by going to [email or web address] or by calling [phone].

## 2024-11-21 NOTE — DISCHARGE NOTE PROVIDER - NSDCCPCAREPLAN_GEN_ALL_CORE_FT
PRINCIPAL DISCHARGE DIAGNOSIS  Diagnosis: Testicular torsion  Assessment and Plan of Treatment: s/p scrotal exploration and orchiopexy

## 2024-11-21 NOTE — DISCHARGE NOTE PROVIDER - CARE PROVIDER_API CALL
[address]  Urology  [address] [address]  Phone: [phone]  Fax: [phone]  Follow Up Time:

## 2024-11-21 NOTE — DISCHARGE NOTE NURSING/CASE MANAGEMENT/SOCIAL WORK - NSDCPEFALRISK_GEN_ALL_CORE
For information on Fall & Injury Prevention, visit: [email or web address] OR  [email or web address] OR  https://www.cdc.gov/steadi/patient.html

## 2024-11-21 NOTE — DISCHARGE NOTE PROVIDER - HOSPITAL COURSE
23YO male with no PMH admitted for testicular pain. On US found to have testicular torsion. Patient brought to OR and underwent scrotal exploration and bilateral orchiopexy. Patient tolerated procedure well and no post operative complications were noted. Patient is optimized for discharge with outpatient follow up.

## 2024-11-21 NOTE — PROGRESS NOTE ADULT - ASSESSMENT
Patient called in stating that his medication LAMICTAL 200mg was sent to the wrong pharmacy that it needs to be sent to     [place] [address] [address] - P [phone] - F [phone]      Please advise
24M hx asthma, PTX s/p bilat orchiopexy 11/20    Plan:  -SCD's  -OOB  -IS  -pain and nausea control  -dispo planning
24M hx asthma, PTX s/p bilat orchiopexy 11/20    Plan:  -SCD's  -OOB  -IS  -pain and nausea control  -dispo planning

## 2024-11-21 NOTE — DISCHARGE NOTE NURSING/CASE MANAGEMENT/SOCIAL WORK - PATIENT PORTAL LINK FT
You can access the FollowMyHealth Patient Portal offered by [facility] by registering at the following website: [email or web address]. By joining [employer]’s FollowMyHealth portal, you will also be able to view your health information using other applications (apps) compatible with our system.

## 2024-11-21 NOTE — DISCHARGE NOTE PROVIDER - NSDCFUADDINST_GEN_ALL_CORE_FT
if you take narcotics for pain please take colace to prevent constipation     General Discharge Instructions:  Use Tylenol for pain control as needed  Please resume all regular home medications unless specifically advised not to take a particular medication. Also, please take any new medications as prescribed.  Please get plenty of rest, continue to ambulate several times per day, and drink adequate amounts of fluids.      Warning Signs:  Please call your doctor if you experience the following:  *You experience new chest pain, pressure, squeezing or tightness.  *New or worsening cough, shortness of breath, or wheeze.  *If you are vomiting and cannot keep down fluids or your medications.  *You are getting dehydrated due to continued vomiting, diarrhea, or other reasons. Signs of dehydration include dry mouth, rapid heartbeat, or feeling dizzy or faint when standing.  *You see blood or dark/black material when you vomit or have a bowel movement.  *You experience burning when you urinate, have blood in your urine, or experience a discharge.  *Your pain is not improving within 8-12 hours or is not gone within 24 hours. Call or return immediately if your pain is getting worse, changes location, or moves to your chest or back.  *You have shaking chills, or fever greater than 100.4 degrees Fahrenheit.  *Any change in your symptoms, or any new symptoms that concern you   if you take narcotics for pain please take colace to prevent constipation. If you take the percocet for severe pain please be aware that there is acetaminophen (tylenol) in the percocet. do no exceed 4,000mg of acetaminophen daily.     General Discharge Instructions:  Use Tylenol for pain control as needed  Please resume all regular home medications unless specifically advised not to take a particular medication. Also, please take any new medications as prescribed.  Please get plenty of rest, continue to ambulate several times per day, and drink adequate amounts of fluids.      Warning Signs:  Please call your doctor if you experience the following:  *You experience new chest pain, pressure, squeezing or tightness.  *New or worsening cough, shortness of breath, or wheeze.  *If you are vomiting and cannot keep down fluids or your medications.  *You are getting dehydrated due to continued vomiting, diarrhea, or other reasons. Signs of dehydration include dry mouth, rapid heartbeat, or feeling dizzy or faint when standing.  *You see blood or dark/black material when you vomit or have a bowel movement.  *You experience burning when you urinate, have blood in your urine, or experience a discharge.  *Your pain is not improving within 8-12 hours or is not gone within 24 hours. Call or return immediately if your pain is getting worse, changes location, or moves to your chest or back.  *You have shaking chills, or fever greater than 100.4 degrees Fahrenheit.  *Any change in your symptoms, or any new symptoms that concern you   if you take narcotics for pain please take colace to prevent constipation.     General Discharge Instructions:  Use Tylenol for pain control as needed  Please resume all regular home medications unless specifically advised not to take a particular medication. Also, please take any new medications as prescribed.  Please get plenty of rest, continue to ambulate several times per day, and drink adequate amounts of fluids.      Warning Signs:  Please call your doctor if you experience the following:  *You experience new chest pain, pressure, squeezing or tightness.  *New or worsening cough, shortness of breath, or wheeze.  *If you are vomiting and cannot keep down fluids or your medications.  *You are getting dehydrated due to continued vomiting, diarrhea, or other reasons. Signs of dehydration include dry mouth, rapid heartbeat, or feeling dizzy or faint when standing.  *You see blood or dark/black material when you vomit or have a bowel movement.  *You experience burning when you urinate, have blood in your urine, or experience a discharge.  *Your pain is not improving within 8-12 hours or is not gone within 24 hours. Call or return immediately if your pain is getting worse, changes location, or moves to your chest or back.  *You have shaking chills, or fever greater than 100.4 degrees Fahrenheit.  *Any change in your symptoms, or any new symptoms that concern you

## 2024-11-26 PROBLEM — J93.9 PNEUMOTHORAX, UNSPECIFIED: Chronic | Status: ACTIVE | Noted: 2024-11-20

## 2024-11-27 DIAGNOSIS — Z88.0 ALLERGY STATUS TO PENICILLIN: ICD-10-CM

## 2024-11-27 DIAGNOSIS — N50.811 RIGHT TESTICULAR PAIN: ICD-10-CM

## 2024-11-27 DIAGNOSIS — N44.00 TORSION OF TESTIS, UNSPECIFIED: ICD-10-CM

## 2024-12-09 ENCOUNTER — APPOINTMENT (OUTPATIENT)
Dept: UROLOGY | Facility: CLINIC | Age: 24
End: 2024-12-09
Payer: COMMERCIAL

## 2024-12-09 VITALS
HEART RATE: 65 BPM | SYSTOLIC BLOOD PRESSURE: 129 MMHG | RESPIRATION RATE: 16 BRPM | OXYGEN SATURATION: 100 % | TEMPERATURE: 98.2 F | DIASTOLIC BLOOD PRESSURE: 77 MMHG

## 2024-12-09 DIAGNOSIS — Z78.9 OTHER SPECIFIED HEALTH STATUS: ICD-10-CM

## 2024-12-09 DIAGNOSIS — J98.19 OTHER PULMONARY COLLAPSE: ICD-10-CM

## 2024-12-09 DIAGNOSIS — N44.00 TORSION OF TESTIS, UNSPECIFIED: ICD-10-CM

## 2024-12-09 PROCEDURE — 99204 OFFICE O/P NEW MOD 45 MIN: CPT

## 2025-01-14 ENCOUNTER — NON-APPOINTMENT (OUTPATIENT)
Age: 25
End: 2025-01-14

## 2025-01-23 ENCOUNTER — APPOINTMENT (OUTPATIENT)
Dept: THORACIC SURGERY | Facility: CLINIC | Age: 25
End: 2025-01-23
Payer: COMMERCIAL

## 2025-01-23 PROCEDURE — 99213 OFFICE O/P EST LOW 20 MIN: CPT | Mod: 95

## 2025-01-24 ENCOUNTER — APPOINTMENT (OUTPATIENT)
Dept: PULMONOLOGY | Facility: CLINIC | Age: 25
End: 2025-01-24

## (undated) DEVICE — XI ENDOWRIST SUCTION IRRIGATOR 8MM

## (undated) DEVICE — TROCAR ETHICON ENDOPATH XCEL BLADELESS 12MM X 100MM STABILITY

## (undated) DEVICE — WARMING BLANKET LOWER ADULT

## (undated) DEVICE — XI VESSEL SEALER

## (undated) DEVICE — DRAIN PENROSE .25" X 18" LATEX

## (undated) DEVICE — SUT VICRYL 3-0 27" SH

## (undated) DEVICE — ELCTR GROUNDING PAD ADULT COVIDIEN

## (undated) DEVICE — ELCTR BOVIE PENCIL HANDPIECE ROCKER SWITCH 15FT

## (undated) DEVICE — MARKING PEN W RULER

## (undated) DEVICE — SUT VICRYL PLUS 2-0 27" SH

## (undated) DEVICE — SUT VLOC 90 4-0 9" CV-23 VIOLET

## (undated) DEVICE — Device

## (undated) DEVICE — XI DRAPE COLUMN

## (undated) DEVICE — VENODYNE/SCD SLEEVE CALF MEDIUM

## (undated) DEVICE — XI STAPLER SUREFORM 60

## (undated) DEVICE — XI ENDOWRIST 12 - 8 MM CANNULA REDUCER

## (undated) DEVICE — SUT PDS II 2-0 27" SH

## (undated) DEVICE — PREP BETADINE KIT

## (undated) DEVICE — STAPLER COVIDIEN ENDO GIA STANDARD HANDLE

## (undated) DEVICE — PACK GENERAL MINOR

## (undated) DEVICE — XI DRAPE ARM

## (undated) DEVICE — XI ARM GRASPER TIP UP FENESTRATED

## (undated) DEVICE — TROCAR ETHICON ENDOPATH XCEL BLADELESS 12MM X 100MM SMOOTH

## (undated) DEVICE — VESSEL LOOP EXTRA MAXI-BLUE 0.200" X 22"

## (undated) DEVICE — ENDOCATCH GENERAL 15MM (PURPLE)

## (undated) DEVICE — GLV 7.5 PROTEXIS (WHITE)

## (undated) DEVICE — XI OBTURATOR OPTICAL BLADELESS 8MM

## (undated) DEVICE — D HELP - CLEARVIEW CLEARIFY SYSTEM

## (undated) DEVICE — XI SEAL UNIVERSIAL 5-12MM

## (undated) DEVICE — DVC ASCOPE 4 SNGL USE SLIM

## (undated) DEVICE — XI STAPLER SUREFORM 45

## (undated) DEVICE — PACK ROBOTIC

## (undated) DEVICE — SUPP ATHLETIC MALE XLG 44-55IN

## (undated) DEVICE — GOWN XL W TOWEL

## (undated) DEVICE — SUT CHROMIC 4-0 27" RB-1

## (undated) DEVICE — SUT CHROMIC 3-0 27" SH

## (undated) DEVICE — DRSG KERLIX ROLL LG 4.5"

## (undated) DEVICE — GOWN XXL

## (undated) DEVICE — POSITIONER FOAM EGG CRATE ULNAR 2PCS (PINK)

## (undated) DEVICE — GLV 8 PROTEXIS (WHITE)

## (undated) DEVICE — TROCAR ETHICON ENDOPATH XCEL BLADELESS 15MM X 100MM STABILITY

## (undated) DEVICE — DRSG GAUZE PACKTNER ROLL

## (undated) DEVICE — SUT PROLENE 0 30" CT-1

## (undated) DEVICE — XI ENDOWRIST STAPLER SHEATH

## (undated) DEVICE — TROCAR ETHICON ENDOPATH XCEL BLADELESS 5MM X 100MM STABILITY

## (undated) DEVICE — ELCTR BOVIE TIP SPATULA MEGADYNE E-Z CLEAN LAPAROSCOPIC 13.5" STANDARD

## (undated) DEVICE — SUSPENSORY WITH LEG STRAPS LARGE

## (undated) DEVICE — CHEST DRAIN PLEUR-EVAC DRY/WET ADULT-PEDS SINGLE (QUICK)

## (undated) DEVICE — ENDOCATCH GENERAL 10MM (PURPLE)

## (undated) DEVICE — WARMING BLANKET UPPER ADULT

## (undated) DEVICE — XI TIP COVER

## (undated) DEVICE — XI 12MM AND STAPLER CANNULA SEAL

## (undated) DEVICE — ELCTR STRYKER NEPTUNE SMOKE EVACUATION PENCIL (GREEN)

## (undated) DEVICE — XI SEAL UNIV 5- 8 MM

## (undated) DEVICE — VESSEL LOOP MINI-BLUE 0.075" X 16"

## (undated) DEVICE — TUBING STRYKER PNEUMOCLEAR SMOKE HEAT HUMID